# Patient Record
Sex: MALE | Race: WHITE | NOT HISPANIC OR LATINO | Employment: FULL TIME | URBAN - NONMETROPOLITAN AREA
[De-identification: names, ages, dates, MRNs, and addresses within clinical notes are randomized per-mention and may not be internally consistent; named-entity substitution may affect disease eponyms.]

---

## 2017-08-22 ENCOUNTER — HOSPITAL ENCOUNTER (EMERGENCY)
Facility: HOSPITAL | Age: 67
Discharge: HOME | End: 2017-08-22
Attending: EMERGENCY MEDICINE
Payer: MEDICARE

## 2017-08-22 VITALS
BODY MASS INDEX: 45.47 KG/M2 | HEART RATE: 64 BPM | HEIGHT: 68 IN | TEMPERATURE: 97 F | OXYGEN SATURATION: 96 % | WEIGHT: 300 LBS | DIASTOLIC BLOOD PRESSURE: 72 MMHG | RESPIRATION RATE: 18 BRPM | SYSTOLIC BLOOD PRESSURE: 139 MMHG

## 2017-08-22 DIAGNOSIS — S61.214A LACERATION OF RIGHT RING FINGER: Primary | ICD-10-CM

## 2017-08-22 DIAGNOSIS — D66 FACTOR VIII DEFICIENCY HEMOPHILIA (HCC): ICD-10-CM

## 2017-08-22 LAB
ANION GAP SERPL CALCULATED.3IONS-SCNC: 8 MMOL/L (ref 4–13)
APTT PPP: 39 SECONDS (ref 23–35)
BASOPHILS # BLD AUTO: 0.04 THOUSANDS/ΜL (ref 0–0.1)
BASOPHILS NFR BLD AUTO: 0 % (ref 0–1)
BUN SERPL-MCNC: 18 MG/DL (ref 5–25)
CALCIUM SERPL-MCNC: 9.1 MG/DL (ref 8.3–10.1)
CHLORIDE SERPL-SCNC: 102 MMOL/L (ref 100–108)
CO2 SERPL-SCNC: 29 MMOL/L (ref 21–32)
CREAT SERPL-MCNC: 0.92 MG/DL (ref 0.6–1.3)
EOSINOPHIL # BLD AUTO: 0.18 THOUSAND/ΜL (ref 0–0.61)
EOSINOPHIL NFR BLD AUTO: 2 % (ref 0–6)
ERYTHROCYTE [DISTWIDTH] IN BLOOD BY AUTOMATED COUNT: 15.6 % (ref 11.6–15.1)
GFR SERPL CREATININE-BSD FRML MDRD: 85 ML/MIN/1.73SQ M
GLUCOSE SERPL-MCNC: 98 MG/DL (ref 65–140)
HCT VFR BLD AUTO: 44.5 % (ref 36.5–49.3)
HGB BLD-MCNC: 14.4 G/DL (ref 12–17)
INR PPP: 1.03 (ref 0.86–1.16)
LYMPHOCYTES # BLD AUTO: 2.54 THOUSANDS/ΜL (ref 0.6–4.47)
LYMPHOCYTES NFR BLD AUTO: 23 % (ref 14–44)
MCH RBC QN AUTO: 27.6 PG (ref 26.8–34.3)
MCHC RBC AUTO-ENTMCNC: 32.4 G/DL (ref 31.4–37.4)
MCV RBC AUTO: 85 FL (ref 82–98)
MONOCYTES # BLD AUTO: 0.95 THOUSAND/ΜL (ref 0.17–1.22)
MONOCYTES NFR BLD AUTO: 8 % (ref 4–12)
NEUTROPHILS # BLD AUTO: 7.55 THOUSANDS/ΜL (ref 1.85–7.62)
NEUTS SEG NFR BLD AUTO: 67 % (ref 43–75)
PLATELET # BLD AUTO: 229 THOUSANDS/UL (ref 149–390)
PMV BLD AUTO: 8 FL (ref 8.9–12.7)
POTASSIUM SERPL-SCNC: 4.4 MMOL/L (ref 3.5–5.3)
PROTHROMBIN TIME: 13.4 SECONDS (ref 12.1–14.4)
RBC # BLD AUTO: 5.21 MILLION/UL (ref 3.88–5.62)
SODIUM SERPL-SCNC: 139 MMOL/L (ref 136–145)
WBC # BLD AUTO: 11.26 THOUSAND/UL (ref 4.31–10.16)

## 2017-08-22 PROCEDURE — 85730 THROMBOPLASTIN TIME PARTIAL: CPT | Performed by: EMERGENCY MEDICINE

## 2017-08-22 PROCEDURE — 36415 COLL VENOUS BLD VENIPUNCTURE: CPT | Performed by: EMERGENCY MEDICINE

## 2017-08-22 PROCEDURE — 85610 PROTHROMBIN TIME: CPT | Performed by: EMERGENCY MEDICINE

## 2017-08-22 PROCEDURE — 80048 BASIC METABOLIC PNL TOTAL CA: CPT | Performed by: EMERGENCY MEDICINE

## 2017-08-22 PROCEDURE — 96365 THER/PROPH/DIAG IV INF INIT: CPT

## 2017-08-22 PROCEDURE — 85025 COMPLETE CBC W/AUTO DIFF WBC: CPT | Performed by: EMERGENCY MEDICINE

## 2017-08-22 PROCEDURE — 99283 EMERGENCY DEPT VISIT LOW MDM: CPT

## 2017-08-22 RX ORDER — SIMVASTATIN 5 MG
5 TABLET ORAL
COMMUNITY

## 2017-08-22 RX ORDER — CEPHALEXIN 500 MG/1
500 CAPSULE ORAL 2 TIMES DAILY
Qty: 14 CAPSULE | Refills: 0 | Status: SHIPPED | OUTPATIENT
Start: 2017-08-22 | End: 2017-08-29

## 2017-08-22 RX ORDER — VARDENAFIL HYDROCHLORIDE 20 MG/1
20 TABLET ORAL DAILY PRN
COMMUNITY

## 2017-08-22 RX ORDER — HYDROCHLOROTHIAZIDE 12.5 MG/1
12.5 CAPSULE, GELATIN COATED ORAL DAILY
COMMUNITY

## 2017-08-22 RX ORDER — LOSARTAN POTASSIUM 100 MG/1
100 TABLET ORAL DAILY
COMMUNITY

## 2017-08-22 RX ORDER — LEVOTHYROXINE SODIUM 0.1 MG/1
100 TABLET ORAL DAILY
COMMUNITY

## 2017-08-22 RX ORDER — CLONIDINE HYDROCHLORIDE 0.1 MG/1
0.1 TABLET ORAL DAILY
COMMUNITY

## 2017-08-22 RX ORDER — TRIAMCINOLONE ACETONIDE 1 MG/G
CREAM TOPICAL 2 TIMES DAILY
COMMUNITY

## 2017-08-22 RX ORDER — CYCLOBENZAPRINE HCL 10 MG
10 TABLET ORAL 3 TIMES DAILY PRN
COMMUNITY

## 2017-08-22 RX ORDER — ESOMEPRAZOLE MAGNESIUM 40 MG/1
40 CAPSULE, DELAYED RELEASE ORAL
COMMUNITY

## 2017-08-22 RX ORDER — ARMODAFINIL 250 MG/1
250 TABLET ORAL DAILY
COMMUNITY

## 2017-08-22 RX ADMIN — CEFAZOLIN SODIUM 1000 MG: 1 SOLUTION INTRAVENOUS at 22:17

## 2019-08-29 ENCOUNTER — HOSPITAL ENCOUNTER (EMERGENCY)
Facility: HOSPITAL | Age: 69
Discharge: HOME | End: 2019-08-29
Attending: EMERGENCY MEDICINE | Admitting: EMERGENCY MEDICINE
Payer: MEDICARE

## 2019-08-29 ENCOUNTER — APPOINTMENT (EMERGENCY)
Dept: RADIOLOGY | Facility: HOSPITAL | Age: 69
End: 2019-08-29
Payer: MEDICARE

## 2019-08-29 ENCOUNTER — APPOINTMENT (EMERGENCY)
Dept: NON INVASIVE DIAGNOSTICS | Facility: HOSPITAL | Age: 69
End: 2019-08-29
Payer: MEDICARE

## 2019-08-29 VITALS
RESPIRATION RATE: 18 BRPM | DIASTOLIC BLOOD PRESSURE: 84 MMHG | HEIGHT: 68 IN | OXYGEN SATURATION: 95 % | HEART RATE: 63 BPM | BODY MASS INDEX: 44.24 KG/M2 | TEMPERATURE: 97.2 F | WEIGHT: 291.89 LBS | SYSTOLIC BLOOD PRESSURE: 142 MMHG

## 2019-08-29 DIAGNOSIS — S83.91XA RIGHT KNEE SPRAIN: Primary | ICD-10-CM

## 2019-08-29 PROCEDURE — 73562 X-RAY EXAM OF KNEE 3: CPT

## 2019-08-29 PROCEDURE — 99284 EMERGENCY DEPT VISIT MOD MDM: CPT

## 2019-08-29 PROCEDURE — 93971 EXTREMITY STUDY: CPT

## 2019-08-29 PROCEDURE — 93971 EXTREMITY STUDY: CPT | Performed by: SURGERY

## 2019-08-29 PROCEDURE — 99282 EMERGENCY DEPT VISIT SF MDM: CPT | Performed by: PHYSICIAN ASSISTANT

## 2019-08-29 RX ORDER — VITAMIN B COMPLEX
1 CAPSULE ORAL DAILY
COMMUNITY

## 2019-08-29 RX ORDER — FUROSEMIDE 40 MG/1
80 TABLET ORAL 2 TIMES DAILY
COMMUNITY

## 2019-08-29 RX ORDER — RIBOFLAVIN (VITAMIN B2) 100 MG
100 TABLET ORAL DAILY
COMMUNITY

## 2019-08-29 RX ORDER — MAGNESIUM 30 MG
TABLET ORAL DAILY
COMMUNITY

## 2019-08-29 RX ORDER — MULTIVITAMIN
1 CAPSULE ORAL DAILY
COMMUNITY

## 2019-08-29 RX ORDER — OMEGA-3S/DHA/EPA/FISH OIL/D3 300MG-1000
400 CAPSULE ORAL DAILY
COMMUNITY

## 2019-08-29 NOTE — ED PROVIDER NOTES
History  Chief Complaint   Patient presents with    Knee Pain     patient reports that he has had right knee pain for a week  he states that he can't remember if he injured it or not  bruising noted to the right medial aspect of knee     Patient presents to the emergency department today ambulatory via private vehicle alone offering a chief complaint of right-sided knee pain with some what he believes to be swelling and bruising of the medial aspect of the right knee  He states he has had the pain in the right knee medial aspect for about a week  He denies any acute traumatic events  No twisting or falls  States he just noted the bruising over the last 12-24 hours  Denies any chest pain shortness of breath  States he does not take any anticoagulants  Prior to Admission Medications   Prescriptions Last Dose Informant Patient Reported? Taking?    Antihemophilic Factor, Recomb, (RECOMBINATE IV)   Yes No   Sig: Infuse into a venous catheter   Armodafinil (NUVIGIL) 250 MG tablet   Yes Yes   Sig: Take 250 mg by mouth daily   Ascorbic Acid (VITAMIN C) 100 MG tablet   Yes Yes   Sig: Take 100 mg by mouth daily   Multiple Vitamin (MULTIVITAMIN) capsule   Yes Yes   Sig: Take 1 capsule by mouth daily   Potassium 99 MG TABS   Yes Yes   Sig: Take 99 mg by mouth 3 (three) times a day   Psyllium (METAMUCIL PO)   Yes Yes   Sig: Take by mouth daily   b complex vitamins capsule   Yes Yes   Sig: Take 1 capsule by mouth daily   cholecalciferol (VITAMIN D3) 400 units tablet   Yes Yes   Sig: Take 400 Units by mouth daily   cloNIDine (CATAPRES) 0 1 mg tablet   Yes Yes   Sig: Take 0 1 mg by mouth daily    cyclobenzaprine (FLEXERIL) 10 mg tablet   Yes No   Sig: Take 10 mg by mouth 3 (three) times a day as needed for muscle spasms   esomeprazole (NexIUM) 40 MG capsule   Yes Yes   Sig: Take 40 mg by mouth 2 (two) times a day before meals    furosemide (LASIX) 40 mg tablet   Yes Yes   Sig: Take 80 mg by mouth 2 (two) times a day hydrochlorothiazide (MICROZIDE) 12 5 mg capsule   Yes No   Sig: Take 12 5 mg by mouth daily   levothyroxine 100 mcg tablet   Yes Yes   Sig: Take 100 mcg by mouth daily   losartan (COZAAR) 100 MG tablet   Yes Yes   Sig: Take 100 mg by mouth daily   magnesium 30 MG tablet   Yes Yes   Sig: Take by mouth daily   metoprolol tartrate (LOPRESSOR) 25 mg tablet   Yes Yes   Sig: Take 25 mg by mouth 3 (three) times a day    simvastatin (ZOCOR) 5 MG tablet   Yes Yes   Sig: Take 5 mg by mouth daily at bedtime   triamcinolone (KENALOG) 0 1 % cream   Yes No   Sig: Apply topically 2 (two) times a day   vardenafil (LEVITRA) 20 MG tablet   Yes Yes   Sig: Take 20 mg by mouth daily as needed for erectile dysfunction      Facility-Administered Medications: None       Past Medical History:   Diagnosis Date    Arthritis     Cancer (HCC)     prostate, basil cell carcinoma    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[address] )     Hypertension        Past Surgical History:   Procedure Laterality Date    PROSTATECTOMY      UMBILICAL HERNIA REPAIR         History reviewed  No pertinent family history  I have reviewed and agree with the history as documented  Social History     Tobacco Use    Smoking status: Former Smoker    Smokeless tobacco: Never Used   Substance Use Topics    Alcohol use: Yes     Comment: social     Drug use: No        Review of Systems   Constitutional: Negative  Negative for activity change, appetite change, chills, diaphoresis, fatigue, fever and unexpected weight change  HENT: Negative  Negative for sore throat, trouble swallowing and voice change  Eyes: Negative  Respiratory: Negative  Negative for cough, chest tightness, shortness of breath and wheezing  Cardiovascular: Negative  Negative for chest pain, palpitations and leg swelling  Gastrointestinal: Negative  Negative for abdominal pain, blood in stool, nausea and vomiting  Endocrine: Negative  Genitourinary: Negative    Negative for flank pain and hematuria  Musculoskeletal: Negative  Negative for arthralgias, back pain, gait problem, joint swelling, myalgias, neck pain and neck stiffness  Right knee pain   Skin: Negative  Negative for rash and wound  Allergic/Immunologic: Negative  Neurological: Negative  Negative for dizziness, seizures, syncope, weakness, light-headedness and headaches  Hematological: Negative  Psychiatric/Behavioral: Negative  All other systems reviewed and are negative  Physical Exam  Physical Exam   Constitutional: He is oriented to person, place, and time  He appears well-developed and well-nourished  No distress  HENT:   Head: Normocephalic  Eyes: Pupils are equal, round, and reactive to light  EOM are normal    Neck: Normal range of motion  Cardiovascular: Normal rate  Pulmonary/Chest: Effort normal    Musculoskeletal: Normal range of motion  He exhibits edema and tenderness  Patient exhibits edema of the medial left knee with an area what appears to be contusion  Normal neurovascular motor exams distally  Neurological: He is alert and oriented to person, place, and time  Skin: Skin is warm  He is not diaphoretic  Vitals reviewed  Vital Signs  ED Triage Vitals [08/29/19 1205]   Temperature Pulse Respirations Blood Pressure SpO2   (!) 97 2 °F (36 2 °C) 63 16 155/76 97 %      Temp Source Heart Rate Source Patient Position - Orthostatic VS BP Location FiO2 (%)   Temporal Monitor Lying Right arm --      Pain Score       2           Vitals:    08/29/19 1205 08/29/19 1230 08/29/19 1300   BP: 155/76 142/77 142/84   Pulse: 63 62 63   Patient Position - Orthostatic VS: Lying Sitting Sitting         Visual Acuity      ED Medications  Medications - No data to display    Diagnostic Studies  Results Reviewed     None                 XR knee 3 views right non injury   ED Interpretation by Nelida Hodges PA-C (08/29 1250)   No evidence of acute osseous abnormalities    No significant effusion      Final Result by Iftikhar Gerardo MD (08/29 1313)      No acute osseous abnormality  Workstation performed: [id number]         VAS lower limb venous duplex study, unilateral/limited    (Results Pending)              Procedures  Procedures       ED Course  ED Course as of Aug 29 1321   Thu Aug 29, 2019   1211 Blood Pressure: 155/76   1211 Temperature(!): 97 2 °F (36 2 °C)   1211 Pulse: 63   1211 Respirations: 16   1211 SpO2: 97 %   1250 Awaiting vascular ultrasound      1252 No evidence of DVT per ultrasound technologist      7538 FINDINGS:    There is no acute fracture or dislocation  There is no joint effusion  Mild osteoarthritis with small osteophytes seen  No lytic or blastic lesions are seen  Soft tissues are unremarkable  Impression      No acute osseous abnormality  1320 Patient will be given follow-up information for orthopedics that he can utilize if the patient does not note improvement in symptoms  MDM    Disposition  Final diagnoses:   Right knee sprain     Time reflects when diagnosis was documented in both MDM as applicable and the Disposition within this note     Time User Action Codes Description Comment    8/29/2019 12:52 PM Rain Martini Add [E51 10MW] Right knee sprain       ED Disposition     ED Disposition Condition Date/Time Comment    Discharge Stable Thu Aug 29, 2019 12:52 PM Claudia Mcrae discharge to home/self care              Follow-up Information     Follow up With Specialties Details Why Contact Info Additional [address] [facility] [place] [facility] Schedule an appointment as soon as possible for a visit  If symptoms worsen [address] [facility] [phone]  [address] [place] [address] [facility] [place], [address] [place] [address], [place], [place], [phone]          Patient's Medications   Discharge Prescriptions    No medications on file No discharge procedures on file      ED Provider  Electronically Signed by           Nelida Hodges PA-C  08/29/19 9206

## 2022-08-18 ENCOUNTER — OFFICE VISIT (OUTPATIENT)
Dept: URGENT CARE | Facility: CLINIC | Age: 72
End: 2022-08-18
Payer: MEDICARE

## 2022-08-18 VITALS
OXYGEN SATURATION: 97 % | SYSTOLIC BLOOD PRESSURE: 164 MMHG | TEMPERATURE: 98.5 F | RESPIRATION RATE: 16 BRPM | HEART RATE: 78 BPM | DIASTOLIC BLOOD PRESSURE: 80 MMHG

## 2022-08-18 DIAGNOSIS — B34.9 VIRAL INFECTION: Primary | ICD-10-CM

## 2022-08-18 PROCEDURE — G0463 HOSPITAL OUTPT CLINIC VISIT: HCPCS

## 2022-08-18 PROCEDURE — 99203 OFFICE O/P NEW LOW 30 MIN: CPT

## 2022-08-18 NOTE — PATIENT INSTRUCTIONS
Upper Respiratory infection  - Take Vitamin D3 200IU daily, Vitamin C, and zinc  Rest and drink electrolyte rich fluids  Tylenol and ibuprofen as needed for fevers and aches following package dosing instructions  Chicken noodle soup    - No change in management if you have COVID, wear mask for the next 5 days    - Sore throat: Throat lozenges, anesthetic throat spray, warm tea with honey, and/or salt water gurgles  - Congestion relief with eating spicy food or cooking jalapeno peppers in a pan and smelling the capsicin that cooks off  Mucinex 600mg 1 tablet every 12 hours  You can use afrin or flonase for nasal congestion as directed on their packaging  Warm or cool air mist humidifiers    - Nighttime cough relief with Mucinex DM or NyQuil   - Go to the ED if you have trouble breathing or shortness of breath at rest, chest pain or pressure that lasts longer than 5 minutes, become confused or hard to wake, your lips or face are blue, you have a fever of 104°F (40°C) or higher   - Follow up with Family doctor as needed, however your symptoms may persists for 2-3 weeks from onset

## 2022-08-18 NOTE — PROGRESS NOTES
[facility]        NAME: Johnathon Lawrence is a 66 y o  male  : 1950    MRN: [id number]  DATE: 2022  TIME: 3:50 PM    Assessment and Plan   Viral infection [B34 9]  1  Viral infection           Patient Instructions     Upper Respiratory infection  - Take Vitamin D3 200IU daily, Vitamin C, and zinc  Rest and drink electrolyte rich fluids  Tylenol and ibuprofen as needed for fevers and aches following package dosing instructions  Chicken noodle soup    - No change in management if you have COVID, wear mask for the next 5 days    - Sore throat: Throat lozenges, anesthetic throat spray, warm tea with honey, and/or salt water gurgles  - Congestion relief with eating spicy food or cooking jalapeno peppers in a pan and smelling the capsicin that cooks off  Mucinex 600mg 1 tablet every 12 hours  You can use afrin or flonase for nasal congestion as directed on their packaging  Warm or cool air mist humidifiers    - Nighttime cough relief with Mucinex DM or NyQuil   - Go to the ED if you have trouble breathing or shortness of breath at rest, chest pain or pressure that lasts longer than 5 minutes, become confused or hard to wake, your lips or face are blue, you have a fever of 104°F (40°C) or higher   - Follow up with Family doctor as needed, however your symptoms may persists for 2-3 weeks from onset  Follow up with PCP in 3-5 days  Proceed to  ER if symptoms worsen  Chief Complaint     Chief Complaint   Patient presents with   Dannis Rubio Like Symptoms     COVID exposure, Saturday patient had sore throat, that has resolved         History of Present Illness     Johnathon Lawrence is a 66 y o  male who presents with complaint of COVID positive exposure 5 days ago and resolved odynophagia, congestion, and body aches that started 5 days ago and resolved 3 days ago  He reports sitting next to sister in car for 30 minutes and nephew  Both are COVID positive as of 3 days ago   He denies fevers, chills, dysphagia, shortness of breath, difficulty breathing, nausea, vomiting, diarrhea  He is fully vaccinated against COVID 19  Review of Systems   Review of Systems   Constitutional: Negative for chills, fatigue and fever  HENT: Positive for congestion and sore throat  Negative for drooling, ear pain, rhinorrhea, sinus pressure, trouble swallowing and voice change  Respiratory: Negative for cough, shortness of breath and wheezing  Cardiovascular: Negative for chest pain and palpitations  Gastrointestinal: Negative for abdominal pain, constipation, diarrhea, nausea and vomiting  Musculoskeletal: Negative for myalgias  Neurological: Negative for dizziness, light-headedness and headaches           Current Medications       Current Outpatient Medications:     Antihemophilic Factor, Recomb, (RECOMBINATE IV), Infuse into a venous catheter, Disp: , Rfl:     Armodafinil 250 MG tablet, Take 250 mg by mouth daily, Disp: , Rfl:     Ascorbic Acid (VITAMIN C) 100 MG tablet, Take 100 mg by mouth daily, Disp: , Rfl:     b complex vitamins capsule, Take 1 capsule by mouth daily, Disp: , Rfl:     cholecalciferol (VITAMIN D3) 400 units tablet, Take 400 Units by mouth daily, Disp: , Rfl:     cyclobenzaprine (FLEXERIL) 10 mg tablet, Take 10 mg by mouth 3 (three) times a day as needed for muscle spasms, Disp: , Rfl:     esomeprazole (NexIUM) 40 MG capsule, Take 40 mg by mouth 2 (two) times a day before meals , Disp: , Rfl:     furosemide (LASIX) 40 mg tablet, Take 80 mg by mouth 2 (two) times a day, Disp: , Rfl:     losartan (COZAAR) 100 MG tablet, Take 100 mg by mouth daily, Disp: , Rfl:     metoprolol tartrate (LOPRESSOR) 25 mg tablet, Take 25 mg by mouth 3 (three) times a day , Disp: , Rfl:     Multiple Vitamin (MULTIVITAMIN) capsule, Take 1 capsule by mouth daily, Disp: , Rfl:     Potassium 99 MG TABS, Take 99 mg by mouth 3 (three) times a day, Disp: , Rfl:     Psyllium (METAMUCIL PO), Take by mouth daily, Disp: , Rfl:     simvastatin (ZOCOR) 5 MG tablet, Take 5 mg by mouth daily at bedtime, Disp: , Rfl:     triamcinolone (KENALOG) 0 1 % cream, Apply topically 2 (two) times a day, Disp: , Rfl:     vardenafil (LEVITRA) 20 MG tablet, Take 20 mg by mouth daily as needed for erectile dysfunction, Disp: , Rfl:     cloNIDine (CATAPRES) 0 1 mg tablet, Take 0 1 mg by mouth daily  (Patient not taking: Reported on 8/18/2022), Disp: , Rfl:     hydrochlorothiazide (MICROZIDE) 12 5 mg capsule, Take 12 5 mg by mouth daily (Patient not taking: Reported on 8/18/2022), Disp: , Rfl:     levothyroxine 100 mcg tablet, Take 100 mcg by mouth daily (Patient not taking: Reported on 8/18/2022), Disp: , Rfl:     magnesium 30 MG tablet, Take by mouth daily (Patient not taking: Reported on 8/18/2022), Disp: , Rfl:     Current Allergies     Allergies as of 08/18/2022 - Reviewed 08/18/2022   Allergen Reaction Noted    Advil [ibuprofen]  08/22/2017    Amlodipine  08/22/2017    Asa [aspirin]  08/22/2017    Daypro [oxaprozin]  08/22/2017    Gabapentin  08/22/2017    Lansoprazole  08/22/2017    Levothyroxine  08/22/2017    Omeprazole  08/22/2017    Protonix [pantoprazole]  08/22/2017    Tiazac [diltiazem]  08/22/2017    Vasorex [isoxsuprine]  08/22/2017    Vioxx [rofecoxib]  08/22/2017    Zantac [ranitidine]  08/22/2017            The following portions of the patient's history were reviewed and updated as appropriate: allergies, current medications, past family history, past medical history, past social history, past surgical history and problem list      Past Medical History:   Diagnosis Date    Arthritis     Cancer ([facility] [address] )     prostate, basil cell carcinoma    [facility]     Hypertension        Past Surgical History:   Procedure Laterality Date    PROSTATECTOMY      UMBILICAL HERNIA REPAIR         History reviewed  No pertinent family history  Medications have been verified          Objective /80   Pulse 78   Temp 98 5 °F (36 9 °C)   Resp 16   SpO2 97%   No LMP for male patient  Physical Exam     Physical Exam  Vitals reviewed  Constitutional:       General: He is not in acute distress  Appearance: Normal appearance  He is obese  He is not ill-appearing  HENT:      Head: Normocephalic and atraumatic  Right Ear: Hearing, tympanic membrane, ear canal and external ear normal  No tenderness  There is no impacted cerumen  Tympanic membrane is not injected, perforated or erythematous  Left Ear: Hearing, tympanic membrane, ear canal and external ear normal  No tenderness  There is no impacted cerumen  Tympanic membrane is not injected, perforated or erythematous  Nose: Congestion present  No rhinorrhea  Mouth/Throat:      Lips: Pink  Mouth: Mucous membranes are moist       Pharynx: Oropharynx is clear  Uvula midline  Posterior oropharyngeal erythema and uvula swelling present  No oropharyngeal exudate  Tonsils: No tonsillar exudate or tonsillar abscesses  0 on the right  0 on the left  Eyes:      General:         Right eye: No discharge  Left eye: No discharge  Extraocular Movements: Extraocular movements intact  Conjunctiva/sclera: Conjunctivae normal       Pupils: Pupils are equal, round, and reactive to light  Cardiovascular:      Rate and Rhythm: Normal rate and regular rhythm  Heart sounds: Normal heart sounds  No murmur heard  No friction rub  No gallop  Pulmonary:      Effort: Pulmonary effort is normal       Breath sounds: Normal breath sounds  No wheezing, rhonchi or rales  Neurological:      Mental Status: He is alert

## 2025-01-03 ENCOUNTER — OFFICE VISIT (OUTPATIENT)
Dept: URGENT CARE | Facility: CLINIC | Age: 75
End: 2025-01-03
Payer: MEDICARE

## 2025-01-03 VITALS
HEIGHT: 68 IN | RESPIRATION RATE: 14 BRPM | TEMPERATURE: 96.6 F | SYSTOLIC BLOOD PRESSURE: 132 MMHG | DIASTOLIC BLOOD PRESSURE: 78 MMHG | WEIGHT: 252.6 LBS | HEART RATE: 77 BPM | BODY MASS INDEX: 38.28 KG/M2

## 2025-01-03 DIAGNOSIS — J06.9 VIRAL URI: Primary | ICD-10-CM

## 2025-01-03 PROCEDURE — 99213 OFFICE O/P EST LOW 20 MIN: CPT | Performed by: PREVENTIVE MEDICINE

## 2025-01-03 PROCEDURE — G0463 HOSPITAL OUTPT CLINIC VISIT: HCPCS | Performed by: PREVENTIVE MEDICINE

## 2025-01-03 RX ORDER — CICLOPIROX OLAMINE 7.7 MG/G
CREAM TOPICAL
COMMUNITY
Start: 2024-03-13

## 2025-01-03 RX ORDER — LOSARTAN POTASSIUM AND HYDROCHLOROTHIAZIDE 12.5; 1 MG/1; MG/1
1 TABLET ORAL DAILY
COMMUNITY

## 2025-01-03 RX ORDER — PREDNISONE 20 MG/1
40 TABLET ORAL DAILY
Qty: 10 TABLET | Refills: 0 | Status: SHIPPED | OUTPATIENT
Start: 2025-01-03 | End: 2025-01-08

## 2025-01-03 RX ORDER — TRANEXAMIC ACID 650 MG/1
1300 TABLET ORAL DAILY PRN
COMMUNITY

## 2025-01-03 RX ORDER — LEVOTHYROXINE SODIUM 125 MCG
1 TABLET ORAL DAILY
COMMUNITY
Start: 2024-11-21

## 2025-01-03 RX ORDER — AMOXICILLIN 875 MG/1
875 TABLET, COATED ORAL 2 TIMES DAILY
Qty: 10 TABLET | Refills: 0 | Status: SHIPPED | OUTPATIENT
Start: 2025-01-03 | End: 2025-01-08

## 2025-01-03 NOTE — PATIENT INSTRUCTIONS
Only begin taking the antibiotics if your symptoms do not improve in the next 2 days or if your symptoms worsen. Otherwise, do not take this medicine    Take the sterodis each day with food and take them at the same time each day    Most upper respiratory infections are viral and resolve on their own within 10-14 days. Antibiotics are not indicated for the viral infection, and are only prescribed if there is evidence for a bacterial infection. Sometimes an upper respiratory infection may lead to secondary bacterial infection, such as bacterial sinusitis, in which case antibiotics would be indicated at that time. If your symptoms continue beyond 10-14 days or if you experience ongoing fevers, productive cough with green, brown, bloody phlegm production, you may have developed a bacterial infection. For the uncomplicated viral upper respiratory infection conservative management includes:     Fever and pain control:  Ibuprofen (Motrin) 600mg every 6 hours for fever, headaches, body aches   Ibuprofen is an NSAID. Please stop medication if you experience stomach/abdominal pain and report to your primary care provider.   Ask your primary care provider before you take NSAIDs if you are on any blood thinners, or if you have a history of heart disease, kidney disease, gastric bypass surgery, GI bleed, or poorly controlled high blood pressure.   May use acetaminophen (Tylenol) as directed on the bottle between doses of ibuprofen. Do not exceed 4,000mg of Tylenol a day.   Cough & Congestion:  Guaifenesin (Mucinex) as directed on the bottle for congestion and mucous-y cough.   Dextromethorphan (Delsym, Robitussin) for dry cough and cough suppression   Pseudoephedrine (Sudafed) for congestion and sinus pressure   Sudafed may cause increased heart rate, irregular heart rate, and an increase in blood pressure. Please do not take Sudafed if you have a history of heart disease or high blood pressure.   Sudafed should not be taken if  you are on anti-depressants such as those belonging to the class MAOIs or tricyclics.  Coricidin HBP (chlorpheniramine maleate) can be used as a decongestant in place of other options for those unable to take Sudafed.   Combination cough and cold such as Dimetapp and Mucinex DM also available  Sudafed PE Head Congestion +Flu Severe contains a combination of Sudafed, Tylenol, Mucinex, and Delsym  If prescribed, take Tessalon Pearles or Bromfed/Phenergan DM as directed  Avoid taking prescription cough/congestion medication and OTC options at the same time  Sore Throat:  Cepacol lozenges  Chloraseptic spray  Throat Coat tea  Warm salt water gargles   Vitamin/Minerals:  Vitamin D3 2,000 IU daily  Vitamin C 1000mg twice a day  Some studies suggest that Zinc 12.5-15mg every 2 hours while awake for 5 days may shorten symptom duration by 1-2 days  Other:   Plenty of fluids and rest  Cool mist humidifiers  Nasal sinus rinses such as NettiPot, Neimed, or Navage can be used to help flush out sinuses  Please only use distilled/sterile water that can be purchased at your local pharmacy  Nasal spray options:  Nasal steroid sprays such as Flonase, Nasonex, Nasacort may help with sinus congestion, itchy/watery eyes, clogged ears  These options must be used consistently for at least 2 weeks for full effect  Afrin nasal spray for quick acting congestion relief  Saline nasal spray for dry nose, irritation of the nasal passages  Follow up with PCP in 3-5 days  Proceed to the ED if symptoms worsen

## 2025-01-03 NOTE — PROGRESS NOTES
[facility]        NAME: Jeferson Peterson is a 74 y.o. male  : 1950    MRN: [id number]  DATE: January 3, 2025  TIME: 10:29 AM    Assessment and Plan   Viral URI [J06.9]  1. Viral URI  predniSONE 20 mg tablet    amoxicillin (AMOXIL) 875 mg tablet        Advised patient to utilize over-the-counter medications to help control symptoms.  Will treat patient as a viral URI at this time.  Prednisone sent to pharmacy.  Amoxicillin -advised patient to do a 48 to 72-hour watch and wait prior to starting this medication.  If symptoms worsen in that time.  Or if symptoms do not improve in the next 72 hours he was advised he may start this medication at that time.  Advised to follow-up with family doctor if symptoms are not improved in the next 5 to 7 days.  However, if he develops any worsening symptoms, chest pain/pressure/tightness, shortness of breath or palpitations he was advised to seek care with the emergency room for further evaluation and treatment.    Patient Instructions       Follow up with PCP in 3-5 days.  Proceed to  ER if symptoms worsen.    If tests are performed, our office will contact you with results only if changes need to made to the care plan discussed with you at the visit. You can review your full results on [facility].    Chief Complaint     Chief Complaint   Patient presents with    Cough     Started 3 days ago. OTC tylenol taken this am .     Sore Throat    Generalized Body Aches         History of Present Illness       74-year-old male with significant past medical history presents to this clinic with complaint of cough, sore throat, generalized bodyaches.  Symptoms started 3 days ago (24).  He took 1 dose of over-the-counter Tylenol this a.m.    Cough  Associated symptoms include myalgias and a sore throat.   Sore Throat   Associated symptoms include coughing.   Generalized Body Aches  Associated symptoms include a sore throat and coughing.       Review of Systems    Review of Systems   HENT:  Positive for sore throat.    Respiratory:  Positive for cough.    Musculoskeletal:  Positive for myalgias.         Current Medications       Current Outpatient Medications:     amoxicillin (AMOXIL) 875 mg tablet, Take 1 tablet (875 mg total) by mouth 2 (two) times a day for 5 days, Disp: 10 tablet, Rfl: 0    Antihemophilic Factor, Recomb, (RECOMBINATE IV), Infuse into a venous catheter, Disp: , Rfl:     Armodafinil 250 MG tablet, Take 250 mg by mouth daily, Disp: , Rfl:     Ascorbic Acid (VITAMIN C) 100 MG tablet, Take 100 mg by mouth daily, Disp: , Rfl:     b complex vitamins capsule, Take 1 capsule by mouth daily, Disp: , Rfl:     cholecalciferol (VITAMIN D3) 400 units tablet, Take 400 Units by mouth daily, Disp: , Rfl:     ciclopirox (LOPROX) 0.77 % cream, , Disp: , Rfl:     COENZYME Q-10 PO, Take 100 mg by mouth daily, Disp: , Rfl:     esomeprazole (NexIUM) 40 MG capsule, Take 40 mg by mouth 2 (two) times a day before meals , Disp: , Rfl:     Ferrous Sulfate Dried (FERROUS SULFATE IRON PO), Take 325 mg by mouth, Disp: , Rfl:     furosemide (LASIX) 40 mg tablet, Take 80 mg by mouth 2 (two) times a day, Disp: , Rfl:     losartan (COZAAR) 100 MG tablet, Take 100 mg by mouth daily, Disp: , Rfl:     losartan-hydrochlorothiazide (HYZAAR) 100-12.5 MG per tablet, Take 1 tablet by mouth daily, Disp: , Rfl:     metoprolol tartrate (LOPRESSOR) 25 mg tablet, Take 25 mg by mouth 3 (three) times a day , Disp: , Rfl:     Multiple Vitamin (MULTIVITAMIN) capsule, Take 1 capsule by mouth daily, Disp: , Rfl:     Potassium 99 MG TABS, Take 99 mg by mouth 3 (three) times a day, Disp: , Rfl:     predniSONE 20 mg tablet, Take 2 tablets (40 mg total) by mouth daily for 5 days, Disp: 10 tablet, Rfl: 0    simvastatin (ZOCOR) 5 MG tablet, Take 5 mg by mouth daily at bedtime, Disp: , Rfl:     Synthroid 125 MCG tablet, Take 1 tablet by mouth in the morning, Disp: , Rfl:     Tranexamic Acid 650 MG TABS, Take  1,300 mg by mouth daily as needed, Disp: , Rfl:     triamcinolone (KENALOG) 0.1 % cream, Apply topically 2 (two) times a day, Disp: , Rfl:     vardenafil (LEVITRA) 20 MG tablet, Take 20 mg by mouth daily as needed for erectile dysfunction, Disp: , Rfl:     cloNIDine (CATAPRES) 0.1 mg tablet, Take 0.1 mg by mouth daily  (Patient not taking: Reported on 1/3/2025), Disp: , Rfl:     cyclobenzaprine (FLEXERIL) 10 mg tablet, Take 10 mg by mouth 3 (three) times a day as needed for muscle spasms (Patient not taking: Reported on 1/3/2025), Disp: , Rfl:     hydrochlorothiazide (MICROZIDE) 12.5 mg capsule, Take 12.5 mg by mouth daily (Patient not taking: Reported on 1/3/2025), Disp: , Rfl:     levothyroxine 100 mcg tablet, Take 100 mcg by mouth daily (Patient not taking: Reported on 1/3/2025), Disp: , Rfl:     magnesium 30 MG tablet, Take by mouth daily (Patient not taking: Reported on 1/3/2025), Disp: , Rfl:     Psyllium (METAMUCIL PO), Take by mouth daily (Patient not taking: Reported on 1/3/2025), Disp: , Rfl:     Current Allergies     Allergies as of 01/03/2025 - Reviewed 01/03/2025   Allergen Reaction Noted    Doxazosin Hypertension 07/01/2013    Enalapril-hydrochlorothiazide Palpitations 07/01/2013    Omeprazole GI Intolerance 12/09/2011    Allopurinol Other (See Comments) 03/08/2018    Amlodipine Other (See Comments) 08/22/2017    Aspirin GI Bleeding 02/17/2011    Calcium channel blockers Other (See Comments) 01/03/2025    Diltiazem Other (See Comments) 02/17/2011    Gabapentin Other (See Comments) 08/22/2017    Ibuprofen GI Bleeding 02/17/2011    Lansoprazole Other (See Comments) 12/09/2011    Levothyroxine Myalgia 08/22/2017    Oxaprozin Other (See Comments) 12/09/2011    Protonix [pantoprazole]  08/22/2017    Ranitidine Other (See Comments) 02/17/2011    Rofecoxib Other (See Comments) 02/17/2011    Vasorex [isoxsuprine]  08/22/2017    Enalapril Palpitations 03/26/2019            The following portions of the  "patient's history were reviewed and updated as appropriate: allergies, current medications, past family history, past medical history, past social history, past surgical history and problem list.     Past Medical History:   Diagnosis Date    Arthritis     Cancer (HCC)     prostate, basil cell carcinoma    Factor VIII deficiency hemophilia (HCC)     Hypertension        Past Surgical History:   Procedure Laterality Date    COLONOSCOPY W/ BIOPSIES AND POLYPECTOMY      PROSTATECTOMY      TONSILLECTOMY AND ADENOIDECTOMY Bilateral     UMBILICAL HERNIA REPAIR         Family History   Problem Relation Age of Onset    No Known Problems Mother     No Known Problems Father          Medications have been verified.        Objective   /78   Pulse 77   Temp (!) 96.6 °F (35.9 °C)   Resp 14   Ht 5' 8\" (1.727 m)   Wt 115 kg (252 lb 9.6 oz)   PF 97 L/min   BMI 38.41 kg/m²        Physical Exam     Physical Exam  Vitals and nursing note reviewed.   Constitutional:       Appearance: Normal appearance. He is obese.   HENT:      Head: Normocephalic and atraumatic.      Right Ear: Ear canal and external ear normal. Tympanic membrane is injected.      Left Ear: Ear canal and external ear normal. Tympanic membrane is injected.      Nose: Congestion and rhinorrhea present. Rhinorrhea is clear.      Right Turbinates: Swollen.      Left Turbinates: Swollen.      Right Sinus: No maxillary sinus tenderness or frontal sinus tenderness.      Left Sinus: No maxillary sinus tenderness or frontal sinus tenderness.      Mouth/Throat:      Lips: Pink.      Mouth: Mucous membranes are moist.      Tongue: No lesions. Tongue does not deviate from midline.      Palate: No mass and lesions.      Pharynx: Uvula midline. Pharyngeal swelling, posterior oropharyngeal erythema, uvula swelling and postnasal drip present.      Comments: Tonsils surgically absent  Eyes:      Extraocular Movements: Extraocular movements intact.      Conjunctiva/sclera: " Conjunctivae normal.      Pupils: Pupils are equal, round, and reactive to light.   Cardiovascular:      Rate and Rhythm: Normal rate and regular rhythm.      Pulses: Normal pulses.      Heart sounds: Normal heart sounds.   Pulmonary:      Effort: Pulmonary effort is normal.      Breath sounds: Normal breath sounds.   Abdominal:      General: Bowel sounds are normal.      Palpations: Abdomen is soft.   Musculoskeletal:      Cervical back: Normal range of motion and neck supple.   Lymphadenopathy:      Cervical: No cervical adenopathy.   Skin:     General: Skin is warm and dry.      Capillary Refill: Capillary refill takes less than 2 seconds.   Neurological:      General: No focal deficit present.      Mental Status: He is alert and oriented to person, place, and time.

## 2025-06-09 ENCOUNTER — OFFICE VISIT (OUTPATIENT)
Dept: URGENT CARE | Facility: CLINIC | Age: 75
End: 2025-06-09
Payer: MEDICARE

## 2025-06-09 VITALS
DIASTOLIC BLOOD PRESSURE: 101 MMHG | HEIGHT: 68 IN | WEIGHT: 255 LBS | SYSTOLIC BLOOD PRESSURE: 185 MMHG | BODY MASS INDEX: 38.65 KG/M2 | HEART RATE: 85 BPM | RESPIRATION RATE: 18 BRPM | OXYGEN SATURATION: 98 % | TEMPERATURE: 98.1 F

## 2025-06-09 DIAGNOSIS — S30.852A: ICD-10-CM

## 2025-06-09 DIAGNOSIS — S30.21XA: ICD-10-CM
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
## 2025-06-09 PROCEDURE — G0463 HOSPITAL OUTPT CLINIC VISIT: HCPCS | Performed by: PHYSICIAN ASSISTANT

## 2025-06-09 PROCEDURE — 99215 OFFICE O/P EST HI 40 MIN: CPT | Performed by: PHYSICIAN ASSISTANT

## 2025-06-09 NOTE — PROGRESS NOTES
1800  Ring intact to base of penis.  Gross swelling and hematoma of the penis noted.  Ambulance called for expedited trip to th ER.

## 2025-06-09 NOTE — PROGRESS NOTES
[facility]        NAME: Jeferson Peterson is a 74 y.o. male  : 1950    MRN: [id number]  DATE: 2025  TIME: 6:27 PM    Assessment and Plan   Ring or other jewelry causing external constriction, initial encounter [W49.04XA]  1. Ring or other jewelry causing external constriction, initial encounter  Transfer to other facility      2. Foreign body in skin of penis        3. Traumatic hematoma of foreskin          Patient with PMH of hemophilia -A presents with a constricting ring lodged at the proximal shaft of the penis, associated with significant distant perineal swelling and underlying hematoma formation.  Findings are consistent with vascular congestion and possible venous outflow obstruction.  Emergency evaluation recommended.  Due to the severity of the penile constriction in association swelling, and underlying hematoma, 911 was called for emergency medical evaluation and transportation to the emergency department for urgent urological assessment and intervention.  Patient Instructions       Proceed to  ER   Chief Complaint     Chief Complaint   Patient presents with    Penis Injury     Ring stuck on penis  Started about 15 hours ago  Getting more painful and swollen         History of Present Illness       Injury  The incident occurred 12 to 24 hours ago. The incident occurred at home. Injury mechanism: ring stuck on penis. The pain is severe. Pertinent negatives include no coughing. There have been no prior injuries to these areas.       Review of Systems   Review of Systems   Constitutional:  Negative for fever.   HENT:  Negative for congestion, drooling, sinus pressure, sinus pain, sore throat and trouble swallowing.    Respiratory:  Negative for cough, chest tightness and shortness of breath.    Cardiovascular: Negative.    Genitourinary:  Positive for penile pain and penile swelling. Negative for decreased urine volume and urgency.         Current Medications     Current  "Medications[1]    Current Allergies     Allergies as of 06/09/2025 - Reviewed 06/09/2025   Allergen Reaction Noted    Doxazosin Hypertension 07/01/2013    Enalapril-hydrochlorothiazide Palpitations 07/01/2013    Omeprazole GI Intolerance 12/09/2011    Allopurinol Other (See Comments) 03/08/2018    Amlodipine Other (See Comments) 08/22/2017    Aspirin GI Bleeding 02/17/2011    Calcium channel blockers Other (See Comments) 01/03/2025    Diltiazem Other (See Comments) 02/17/2011    Gabapentin Other (See Comments) 08/22/2017    Ibuprofen GI Bleeding 02/17/2011    Lansoprazole Other (See Comments) 12/09/2011    Levothyroxine Myalgia 08/22/2017    Oxaprozin Other (See Comments) 12/09/2011    Protonix [pantoprazole]  08/22/2017    Ranitidine Other (See Comments) 02/17/2011    Rofecoxib Other (See Comments) 02/17/2011    Vasorex [isoxsuprine]  08/22/2017    Enalapril Palpitations 03/26/2019            The following portions of the patient's history were reviewed and updated as appropriate: allergies, current medications, past family history, past medical history, past social history, past surgical history and problem list.     Past Medical History[2]    Past Surgical History[3]    Family History[4]      Medications have been verified.        Objective   BP (!) 185/101   Pulse 85   Temp 98.1 °F (36.7 °C)   Resp 18   Ht 5' 8\" (1.727 m)   Wt 116 kg (255 lb)   SpO2 98%   BMI 38.77 kg/m²   No LMP for male patient.       Physical Exam     Physical Exam  Vitals and nursing note reviewed. Exam conducted with a chaperone present.   Constitutional:       General: He is not in acute distress.     Appearance: He is well-developed. He is not diaphoretic.     Cardiovascular:      Rate and Rhythm: Normal rate and regular rhythm.      Heart sounds: Normal heart sounds. No murmur heard.     No friction rub. No gallop.   Pulmonary:      Effort: Pulmonary effort is normal. No respiratory distress.      Breath sounds: Normal breath " sounds. No wheezing, rhonchi or rales.   Abdominal:      Palpations: Abdomen is soft.      Tenderness: There is no abdominal tenderness.   Genitourinary:     Penis: Erythema, tenderness and swelling present.       Comments: Constricting ring lodged at the proximal shaft of the penis, associated with significant distance swelling and underlying hematoma formation.    Skin:     General: Skin is warm.     Neurological:      Mental Status: He is alert.     Psychiatric:         Behavior: Behavior normal.         Thought Content: Thought content normal.         Judgment: Judgment normal.                          [1]   Current Outpatient Medications:     Armodafinil 250 MG tablet, Take 250 mg by mouth in the morning., Disp: , Rfl:     Ascorbic Acid (VITAMIN C) 100 MG tablet, Take 100 mg by mouth in the morning., Disp: , Rfl:     b complex vitamins capsule, Take 1 capsule by mouth in the morning., Disp: , Rfl:     cholecalciferol (VITAMIN D3) 400 units tablet, Take 400 Units by mouth in the morning., Disp: , Rfl:     ciclopirox (LOPROX) 0.77 % cream, , Disp: , Rfl:     COENZYME Q-10 PO, Take 100 mg by mouth in the morning., Disp: , Rfl:     esomeprazole (NexIUM) 40 MG capsule, Take 40 mg by mouth in the morning and 40 mg in the evening. Take before meals., Disp: , Rfl:     furosemide (LASIX) 40 mg tablet, Take 80 mg by mouth in the morning and 80 mg in the evening., Disp: , Rfl:     losartan (COZAAR) 100 MG tablet, Take 100 mg by mouth in the morning., Disp: , Rfl:     losartan-hydrochlorothiazide (HYZAAR) 100-12.5 MG per tablet, Take 1 tablet by mouth in the morning., Disp: , Rfl:     metoprolol tartrate (LOPRESSOR) 25 mg tablet, Take 25 mg by mouth in the morning and 25 mg in the evening and 25 mg before bedtime., Disp: , Rfl:     Multiple Vitamin (MULTIVITAMIN) capsule, Take 1 capsule by mouth in the morning., Disp: , Rfl:     Potassium 99 MG TABS, Take 99 mg by mouth in the morning and 99 mg in the evening and 99 mg  before bedtime., Disp: , Rfl:     simvastatin (ZOCOR) 5 MG tablet, Take 5 mg by mouth daily at bedtime, Disp: , Rfl:     Synthroid 125 MCG tablet, Take 1 tablet by mouth in the morning, Disp: , Rfl:     Tranexamic Acid 650 MG TABS, Take 1,300 mg by mouth daily as needed, Disp: , Rfl:     triamcinolone (KENALOG) 0.1 % cream, Apply topically in the morning and in the evening., Disp: , Rfl:     vardenafil (LEVITRA) 20 MG tablet, Take 20 mg by mouth daily as needed for erectile dysfunction, Disp: , Rfl:     Antihemophilic Factor, Recomb, (RECOMBINATE IV), Infuse into a venous catheter (Patient not taking: Reported on 6/9/2025), Disp: , Rfl:     cloNIDine (CATAPRES) 0.1 mg tablet, Take 0.1 mg by mouth daily  (Patient not taking: Reported on 8/18/2022), Disp: , Rfl:     cyclobenzaprine (FLEXERIL) 10 mg tablet, Take 10 mg by mouth 3 (three) times a day as needed for muscle spasms (Patient not taking: Reported on 6/9/2025), Disp: , Rfl:     Esomeprazole Magnesium (NEXIUM 24HR PO), Take by mouth (Patient not taking: Reported on 6/9/2025), Disp: , Rfl:     Ferrous Sulfate Dried (FERROUS SULFATE IRON PO), Take 325 mg by mouth (Patient not taking: Reported on 6/9/2025), Disp: , Rfl:     hydrochlorothiazide (MICROZIDE) 12.5 mg capsule, Take 12.5 mg by mouth daily (Patient not taking: Reported on 8/18/2022), Disp: , Rfl:     levothyroxine 100 mcg tablet, Take 100 mcg by mouth daily (Patient not taking: Reported on 8/18/2022), Disp: , Rfl:     magnesium 30 MG tablet, Take by mouth daily (Patient not taking: Reported on 8/18/2022), Disp: , Rfl:     Psyllium (METAMUCIL PO), Take by mouth daily (Patient not taking: Reported on 6/9/2025), Disp: , Rfl:   [2]   Past Medical History:  Diagnosis Date    Arthritis     Cancer (HCC)     prostate, basil cell carcinoma    Factor VIII deficiency hemophilia (HCC)     Hypertension    [3]   Past Surgical History:  Procedure Laterality Date    COLONOSCOPY W/ BIOPSIES AND POLYPECTOMY       PROSTATECTOMY      TONSILLECTOMY AND ADENOIDECTOMY Bilateral     UMBILICAL HERNIA REPAIR     [4]   Family History  Problem Relation Name Age of Onset    No Known Problems Mother      No Known Problems Father

## 2025-06-17 ENCOUNTER — APPOINTMENT (OUTPATIENT)
Dept: RADIOLOGY | Facility: CLINIC | Age: 75
End: 2025-06-17
Attending: PHYSICIAN ASSISTANT
Payer: MEDICARE

## 2025-06-17 ENCOUNTER — OFFICE VISIT (OUTPATIENT)
Dept: URGENT CARE | Facility: CLINIC | Age: 75
End: 2025-06-17
Payer: MEDICARE

## 2025-06-17 ENCOUNTER — RESULTS FOLLOW-UP (OUTPATIENT)
Dept: URGENT CARE | Facility: CLINIC | Age: 75
End: 2025-06-17

## 2025-06-17 VITALS
SYSTOLIC BLOOD PRESSURE: 158 MMHG | RESPIRATION RATE: 18 BRPM | DIASTOLIC BLOOD PRESSURE: 78 MMHG | WEIGHT: 255 LBS | OXYGEN SATURATION: 99 % | HEIGHT: 68 IN | BODY MASS INDEX: 38.65 KG/M2 | HEART RATE: 67 BPM | TEMPERATURE: 98.1 F

## 2025-06-17 DIAGNOSIS — M25.552 PAIN OF LEFT HIP: ICD-10-CM

## 2025-06-17 DIAGNOSIS — S72.001A CLOSED FRACTURE OF RIGHT HIP, INITIAL ENCOUNTER (HCC): Primary | ICD-10-CM

## 2025-06-17 PROCEDURE — G0463 HOSPITAL OUTPT CLINIC VISIT: HCPCS | Performed by: PHYSICIAN ASSISTANT

## 2025-06-17 PROCEDURE — 99214 OFFICE O/P EST MOD 30 MIN: CPT | Performed by: PHYSICIAN ASSISTANT

## 2025-06-17 PROCEDURE — 73502 X-RAY EXAM HIP UNI 2-3 VIEWS: CPT

## 2025-06-17 RX ORDER — TRAMADOL HYDROCHLORIDE 50 MG/1
50 TABLET ORAL EVERY 6 HOURS PRN
Qty: 20 TABLET | Refills: 0 | Status: SHIPPED | OUTPATIENT
Start: 2025-06-17

## 2025-06-17 NOTE — PATIENT INSTRUCTIONS
Follow-up with orthopedics sometime in the next week. Referral has been provided for you.   Take tramadol as prescribed    Supplement with Tylenol 1000 mg every 8 hours as needed, alternating every 4 hours with ibuprofen.  Ice 20 minutes on 20 minutes off.  Elevate above the level of the heart whenever not in use.    Follow up with PCP in 3-5 days.  Proceed to  ER if symptoms worsen.    If tests have been performed at [facility], our office will contact you with results if changes need to be made to the care plan discussed with you at the visit.  You can review your full results on [facility] MyChart.

## 2025-06-17 NOTE — PROGRESS NOTES
[facility]        NAME: Jeferson Peterson is a 74 y.o. male  : 1950    MRN: [id number]  DATE: 2025  TIME: 2:17 PM    Assessment and Plan   Closed fracture of right hip, initial encounter ([facility]) [S72.001A]  1. Closed fracture of right hip, initial encounter ([facility])  traMADol (Ultram) 50 mg tablet    Ambulatory Referral to Orthopedic Surgery    CANCELED: Ambulatory Referral to Orthopedic Surgery      2. Pain of left hip  CANCELED: XR hip/pelv 4+ vw left if performed        X-ray HERMANN initial interpretation of hip: Possible fracture.  Prescribed the pain medication for the next 5 day.  Referred him to orthopedic.    Patient Instructions         Follow-up with orthopedics sometime in the next week. Referral has been provided for you.   Take tramadol as prescribed    Supplement with Tylenol 1000 mg every 8 hours as needed, alternating every 4 hours with ibuprofen.  Ice 20 minutes on 20 minutes off.  Elevate above the level of the heart whenever not in use.    Follow up with PCP in 3-5 days.  Proceed to  ER if symptoms worsen.    If tests have been performed at [facility], our office will contact you with results if changes need to be made to the care plan discussed with you at the visit.  [facility]    Chief Complaint     Chief Complaint   Patient presents with    Hip Pain     Right Hip pain symptoms started 2 weeks ago. States he didn't fall that he knows of          History of Present Illness       Hip Pain   Incident onset: 2 weeks. The incident occurred at home. There was no injury mechanism. Pain location: Right hip. The quality of the pain is described as aching. The pain is at a severity of 9/10. The pain is moderate. The pain has been Constant since onset. Associated symptoms include an inability to bear weight. Pertinent negatives include no numbness. He reports no foreign bodies present. The symptoms are aggravated by movement. He has tried  "acetaminophen for the symptoms. The treatment provided no relief.       Review of Systems   Review of Systems   Constitutional:  Negative for chills and fever.   Musculoskeletal:  Positive for arthralgias. Negative for gait problem and joint swelling.   Skin:  Positive for color change.   Neurological:  Negative for weakness and numbness.   All other systems reviewed and are negative.        Current Medications     Current Medications[1]    Current Allergies     Allergies as of 06/17/2025 - Reviewed 06/17/2025   Allergen Reaction Noted    Doxazosin Hypertension 07/01/2013    Enalapril-hydrochlorothiazide Palpitations 07/01/2013    Omeprazole GI Intolerance 12/09/2011    Allopurinol Other (See Comments) 03/08/2018    Amlodipine Other (See Comments) 08/22/2017    Aspirin GI Bleeding 02/17/2011    Calcium channel blockers Other (See Comments) 01/03/2025    Diltiazem Other (See Comments) 02/17/2011    Gabapentin Other (See Comments) 08/22/2017    Ibuprofen GI Bleeding 02/17/2011    Lansoprazole Other (See Comments) 12/09/2011    Levothyroxine Myalgia 08/22/2017    Oxaprozin Other (See Comments) 12/09/2011    Protonix [pantoprazole]  08/22/2017    Ranitidine Other (See Comments) 02/17/2011    Rofecoxib Other (See Comments) 02/17/2011    Vasorex [isoxsuprine]  08/22/2017    Enalapril Palpitations 03/26/2019            The following portions of the patient's history were reviewed and updated as appropriate: allergies, current medications, past family history, past medical history, past social history, past surgical history and problem list.     Past Medical History[2]    Past Surgical History[3]    Family History[4]      Medications have been verified.        Objective   /78 (BP Location: Left arm, Patient Position: Sitting, Cuff Size: Adult)   Pulse 67   Temp 98.1 °F (36.7 °C) (Temporal)   Resp 18   Ht 5' 8\" (1.727 m)   Wt 116 kg (255 lb)   SpO2 99%   BMI 38.77 kg/m²   No LMP for male patient.       Physical " Exam     Physical Exam  Vitals and nursing note reviewed.   Constitutional:       General: He is not in acute distress.     Appearance: He is well-developed.   HENT:      Head: Normocephalic and atraumatic.     Cardiovascular:      Rate and Rhythm: Normal rate and regular rhythm.      Heart sounds: Normal heart sounds. No murmur heard.     No friction rub. No gallop.   Pulmonary:      Effort: Pulmonary effort is normal. No respiratory distress.      Breath sounds: Normal breath sounds. No wheezing or rales.   Chest:      Chest wall: No tenderness.     Musculoskeletal:         General: No deformity.      Right hip: Tenderness, bony tenderness and crepitus present. No deformity or lacerations. Decreased range of motion. Normal strength.     Skin:     General: Skin is warm.      Findings: Bruising and ecchymosis present. No rash.      Comments: Significant bruising on the posterior side of the hip, thigh.     Neurological:      Mental Status: He is alert and oriented to person, place, and time.      Coordination: Coordination normal.      Deep Tendon Reflexes: Reflexes are normal and symmetric. Reflexes normal.     Psychiatric:         Behavior: Behavior normal.         Thought Content: Thought content normal.         Judgment: Judgment normal.                          [1]   Current Outpatient Medications:     Antihemophilic Factor, Recomb, (RECOMBINATE IV), Inject into a catheter in a vein, Disp: , Rfl:     Armodafinil 250 MG tablet, Take 250 mg by mouth in the morning., Disp: , Rfl:     Ascorbic Acid (VITAMIN C) 100 MG tablet, Take 100 mg by mouth in the morning., Disp: , Rfl:     b complex vitamins capsule, Take 1 capsule by mouth in the morning., Disp: , Rfl:     cholecalciferol (VITAMIN D3) 400 units tablet, Take 400 Units by mouth in the morning., Disp: , Rfl:     ciclopirox (LOPROX) 0.77 % cream, , Disp: , Rfl:     cloNIDine (CATAPRES) 0.1 mg tablet, Take 0.1 mg by mouth daily, Disp: , Rfl:     COENZYME Q-10 PO,  Take 100 mg by mouth in the morning., Disp: , Rfl:     cyclobenzaprine (FLEXERIL) 10 mg tablet, Take 10 mg by mouth 3 (three) times a day as needed for muscle spasms, Disp: , Rfl:     esomeprazole (NexIUM) 40 MG capsule, Take 40 mg by mouth in the morning and 40 mg in the evening. Take before meals., Disp: , Rfl:     Esomeprazole Magnesium (NEXIUM 24HR PO), Take by mouth, Disp: , Rfl:     Ferrous Sulfate Dried (FERROUS SULFATE IRON PO), Take 325 mg by mouth, Disp: , Rfl:     furosemide (LASIX) 40 mg tablet, Take 80 mg by mouth in the morning and 80 mg in the evening., Disp: , Rfl:     hydrochlorothiazide (MICROZIDE) 12.5 mg capsule, Take 12.5 mg by mouth daily, Disp: , Rfl:     levothyroxine 100 mcg tablet, Take 100 mcg by mouth daily, Disp: , Rfl:     losartan (COZAAR) 100 MG tablet, Take 100 mg by mouth in the morning., Disp: , Rfl:     losartan-hydrochlorothiazide (HYZAAR) 100-12.5 MG per tablet, Take 1 tablet by mouth in the morning., Disp: , Rfl:     magnesium 30 MG tablet, Take by mouth daily, Disp: , Rfl:     metoprolol tartrate (LOPRESSOR) 25 mg tablet, Take 25 mg by mouth in the morning and 25 mg in the evening and 25 mg before bedtime., Disp: , Rfl:     Multiple Vitamin (MULTIVITAMIN) capsule, Take 1 capsule by mouth in the morning., Disp: , Rfl:     Potassium 99 MG TABS, Take 99 mg by mouth in the morning and 99 mg in the evening and 99 mg before bedtime., Disp: , Rfl:     Psyllium (METAMUCIL PO), Take by mouth daily, Disp: , Rfl:     simvastatin (ZOCOR) 5 MG tablet, Take 5 mg by mouth daily at bedtime, Disp: , Rfl:     Synthroid 125 MCG tablet, Take 1 tablet by mouth in the morning, Disp: , Rfl:     traMADol (Ultram) 50 mg tablet, Take 1 tablet (50 mg total) by mouth every 6 (six) hours as needed for moderate pain, Disp: 20 tablet, Rfl: 0    Tranexamic Acid 650 MG TABS, Take 1,300 mg by mouth daily as needed, Disp: , Rfl:     triamcinolone (KENALOG) 0.1 % cream, Apply topically in the morning and in the  evening., Disp: , Rfl:     vardenafil (LEVITRA) 20 MG tablet, Take 20 mg by mouth daily as needed for erectile dysfunction, Disp: , Rfl:   [2]   Past Medical History:  Diagnosis Date    Arthritis     Cancer (HCC)     prostate, basil cell carcinoma    Factor VIII deficiency hemophilia (HCC)     Hypertension    [3]   Past Surgical History:  Procedure Laterality Date    COLONOSCOPY W/ BIOPSIES AND POLYPECTOMY      PROSTATECTOMY      TONSILLECTOMY AND ADENOIDECTOMY Bilateral     UMBILICAL HERNIA REPAIR     [4]   Family History  Problem Relation Name Age of Onset    No Known Problems Mother      No Known Problems Father

## 2025-06-20 ENCOUNTER — OFFICE VISIT (OUTPATIENT)
Dept: OBGYN CLINIC | Facility: CLINIC | Age: 75
End: 2025-06-20
Attending: PHYSICIAN ASSISTANT
Payer: MEDICARE

## 2025-06-20 VITALS
OXYGEN SATURATION: 94 % | HEIGHT: 68 IN | HEART RATE: 72 BPM | RESPIRATION RATE: 16 BRPM | TEMPERATURE: 98 F | BODY MASS INDEX: 38.71 KG/M2 | WEIGHT: 255.4 LBS

## 2025-06-20 DIAGNOSIS — S70.01XD CONTUSION OF RIGHT HIP, SUBSEQUENT ENCOUNTER: Primary | ICD-10-CM

## 2025-06-20 PROCEDURE — 99204 OFFICE O/P NEW MOD 45 MIN: CPT | Performed by: STUDENT IN AN ORGANIZED HEALTH CARE EDUCATION/TRAINING PROGRAM

## 2025-06-20 NOTE — PROGRESS NOTES
Orthopedic Surgery Office Note  Jeferson Peterson (74 y.o. male)   : 1950   MRN: [id number]   Encounter Date: 2025 with Dr. Sen Pierre, DO  Encounter department: [facility] [place]  Chief Complaint   Patient presents with    Right Hip - Pain       Assessment, Plan, & Discussion:     Assessment & Plan  Contusion of right hip, subsequent encounter  -Reviewed physical exam and imaging with patient at time of visit. Radiographic findings demonstrate no evidence of fracture of right hip. His symptoms are consistent with hip contusion/greater trochanteric bursitis of his Right hip.  -Referral to physical therapy placed at time of visit for ROM and strengthening of right hip  - Discussed with the patient consideration of greater trochanteric bursitis should physical therapy not help to improve his symptoms  - He will follow-up in the office on an as-needed basis if symptoms persist or new symptoms arise  -The patient expresses understanding and is in agreement with today's treatment plan.       Orders:    Ambulatory Referral to Orthopedic Surgery    Ambulatory Referral to Physical Therapy; Future        Surgery:   No surgery planned at this time    Next Visit:      Follow up:  Return if symptoms worsen or fail to improve.  without XR    Tasks:   Re-evaluation of current concern    History of Present Illness:     Jeferson Peterson is a 74 y.o. male who presents for consultation following visit to the ED, here for orthopedic follow up regarding what he was originally told was a right hip fracture however after radiologist read was contacted to let him know that the radiologist did not see a fracture.  Patient was seen in the ED on 2025 for significant right hip pain along with ecchymosis.  Patient states that he is unaware of any mechanism of injury.  He does state that he does have hemophilia.  Patient states that he does have some improved mobility in his right hip however he cannot  "get his leg high enough to put on compression socks.    Review of Systems  Constitutional: Negative for fatigue, fever or loss of appetite.   HENT: Negative.    Respiratory: Negative for shortness of breath, dyspnea.    Cardiovascular: Negative for chest pain/tightness.   Gastrointestinal: Negative for abdominal pain, N/V.   Endocrine: Negative for cold/heat intolerance, unexplained weight loss/gain.   Genitourinary: Negative for flank pain, dysuria  Skin: Negative for rash.    Psychiatric/Behavioral: Negative for agitation.  All else negative unless otherwise noted in HPI    Physical Exam:   General:  Pulse 72   Temp 98 °F (36.7 °C) (Temporal)   Resp 16   Ht 5' 8\" (1.727 m)   Wt 116 kg (255 lb 6.4 oz)   SpO2 94%   BMI 38.83 kg/m²   Estimated body mass index is 38.83 kg/m² as calculated from the following:    Height as of this encounter: 5' 8\" (1.727 m).    Weight as of this encounter: 116 kg (255 lb 6.4 oz).  Cons: Appears well.  No apparent distress.  Psych: Alert. Oriented.  Mood and affect normal.  HEENT: PERRLA, EOMI, Eyes clear, moist mucus membranes, hearing intact  Resp: Normal effort.  No audible wheezing or stridor. equal symmetric chest expansion.  CV: Extremities warm and well perfused. no chest pain, no palpitations, no discernable arrhythmia  Well Perfused peripherally, palpable distal pulse  Abd:    No distention or guarding. no peritoneal signs  Skin: Warm. No visible lesions.no clubbing, no cyanosis  Neuro: Normal muscle tone.     Orthopedic Exam:   Right Hip Exam  Alignment / Posture:  Normal resting hip posture. Leg lengths appear equal. Normal knee alignment. Normal ankle alignment.  Inspection:  Mild swelling. No edema. No erythema.  Resolving ecchymosis. No muscle atrophy. No deformity.  Palpation:  Significant tenderness at greater trochanteric bursitis. No effusion. No warmth. No crepitus. No clicking, catching, or snapping.  ROM:  Normal hip ROM to left hip.  Decreased range of motion " in hip flexion and internal rotation due to pain.  Normal knee ROM.  Strength:  Hip Flexors 5/5. Quadriceps 4/5. Hamstrings 4/5.  Stability:  No objective hip instability.  Tests:  (+) FADDIR. (+) JOSEPH.  Neurovascular:  Sensation intact in DP/SP/Nation/Sa/T nerve distributions. 2+ DP & PT pulses.  Gait:  Steady with cane. Antalgic.       Imaging/Studies:     Study: XR right hip  Date: 6/17/25  Report: I have read and agree with the radiologist report. and I have personally reviewed the imaging via [employer] PACS (Picture Archiving and Communication System) and my impression is as follows:  Impression:   Mild arthritis of the hips. Soft tissue swelling/edema superficially near the right hip and buttock region. Correlate for possible contusion.  Evidence of calcific insertion point of the gluteal musculature at the greater trochanter     Significant lower lumbar disc degenerative changes noted.    Procedures  No procedures today.    Medical, Surgical, Family, and Social History    The patient's medical history, family history, and social history, were reviewed and updated as appropriate.  Past Medical History[1]  Past Surgical History[2]  Family History[3]  Social History     Occupational History    Not on file   Tobacco Use    Smoking status: Former    Smokeless tobacco: Never   Vaping Use    Vaping status: Never Used   Substance and Sexual Activity    Alcohol use: Not Currently     Comment: social     Drug use: Never    Sexual activity: Not on file     Allergies[4]  Current Medications[5]  This Visit:     Scribe Attestation      I,:  Felisha Saucedo am acting as a scribe while in the presence of the attending physician.:       I,:  Sen Pierre DO personally performed the services described in this documentation    as scribed in my presence.:             Dr. Sen Pierre DO, Orthopedic Surgeon  [facility]thopedic Oncology [facility]          [1]   Past Medical History:  Diagnosis Date    Arthritis     Cancer (HCC)      prostate, basil cell carcinoma    Factor VIII deficiency hemophilia (HCC)     Hypertension    [2]   Past Surgical History:  Procedure Laterality Date    COLONOSCOPY W/ BIOPSIES AND POLYPECTOMY      PROSTATECTOMY      TONSILLECTOMY AND ADENOIDECTOMY Bilateral     UMBILICAL HERNIA REPAIR     [3]   Family History  Problem Relation Name Age of Onset    No Known Problems Mother      No Known Problems Father     [4]   Allergies  Allergen Reactions    Doxazosin Hypertension     incr bp    Enalapril-Hydrochlorothiazide Palpitations    Omeprazole GI Intolerance     uncontrollable acid reflex    Other reaction(s): Other   uncontrollable acid reflex    Allopurinol Other (See Comments)     Other reaction(s): NUMBNESS    Amlodipine Other (See Comments)     Other reaction(s): edema   Other reaction(s): Other   Other reaction(s): numbness in toes, throbbing in legs, leg cramps    Aspirin GI Bleeding     Other reaction(s): Other    Calcium Channel Blockers Other (See Comments)     ???    Diltiazem Other (See Comments)     Muscle spasims    ???      ???    Gabapentin Other (See Comments)     Other reaction(s): Other    Ibuprofen GI Bleeding     Cause bleeding    Other reaction(s): Other   Cause bleeding    Lansoprazole Other (See Comments)     Numbness     Other reaction(s): Other   Numbness    Levothyroxine Myalgia    Other Other (See Comments)     ???    Oxaprozin Other (See Comments)     Causes bleeding    Other reaction(s): Other   Causes bleeding    Other reaction(s): Other      Causes bleeding      Causes bleeding  Other reaction(s): Other Causes bleeding    Protonix [Pantoprazole]     Ranitidine Other (See Comments)    Rofecoxib Other (See Comments)     Causes bleeding    Vasorex [Isoxsuprine]     Enalapril Palpitations   [5]   Current Outpatient Medications:     Antihemophilic Factor, Recomb, (RECOMBINATE IV), Inject into a catheter in a vein, Disp: , Rfl:     Armodafinil 250 MG tablet, Take 250 mg by mouth in the  morning., Disp: , Rfl:     Ascorbic Acid (VITAMIN C) 100 MG tablet, Take 100 mg by mouth in the morning., Disp: , Rfl:     b complex vitamins capsule, Take 1 capsule by mouth in the morning., Disp: , Rfl:     cholecalciferol (VITAMIN D3) 400 units tablet, Take 400 Units by mouth in the morning., Disp: , Rfl:     ciclopirox (LOPROX) 0.77 % cream, , Disp: , Rfl:     cloNIDine (CATAPRES) 0.1 mg tablet, Take 0.1 mg by mouth in the morning., Disp: , Rfl:     COENZYME Q-10 PO, Take 100 mg by mouth in the morning., Disp: , Rfl:     cyclobenzaprine (FLEXERIL) 10 mg tablet, Take 10 mg by mouth as needed in the morning and 10 mg as needed at noon and 10 mg as needed in the evening for muscle spasms., Disp: , Rfl:     esomeprazole (NexIUM) 40 MG capsule, Take 40 mg by mouth in the morning and 40 mg in the evening. Take before meals., Disp: , Rfl:     Esomeprazole Magnesium (NEXIUM 24HR PO), Take by mouth, Disp: , Rfl:     Ferrous Sulfate Dried (FERROUS SULFATE IRON PO), Take 325 mg by mouth, Disp: , Rfl:     furosemide (LASIX) 40 mg tablet, Take 80 mg by mouth in the morning and 80 mg in the evening., Disp: , Rfl:     hydrochlorothiazide (MICROZIDE) 12.5 mg capsule, Take 12.5 mg by mouth in the morning., Disp: , Rfl:     levothyroxine 100 mcg tablet, Take 100 mcg by mouth in the morning., Disp: , Rfl:     losartan (COZAAR) 100 MG tablet, Take 100 mg by mouth in the morning., Disp: , Rfl:     losartan-hydrochlorothiazide (HYZAAR) 100-12.5 MG per tablet, Take 1 tablet by mouth in the morning., Disp: , Rfl:     magnesium 30 MG tablet, Take by mouth in the morning., Disp: , Rfl:     metoprolol tartrate (LOPRESSOR) 25 mg tablet, Take 25 mg by mouth in the morning and 25 mg in the evening and 25 mg before bedtime., Disp: , Rfl:     Multiple Vitamin (MULTIVITAMIN) capsule, Take 1 capsule by mouth in the morning., Disp: , Rfl:     Potassium 99 MG TABS, Take 99 mg by mouth in the morning and 99 mg in the evening and 99 mg before  bedtime., Disp: , Rfl:     Psyllium (METAMUCIL PO), Take by mouth in the morning., Disp: , Rfl:     psyllium (METAMUCIL) 58.6 % packet, Take 1 packet by mouth, Disp: , Rfl:     simvastatin (ZOCOR) 5 MG tablet, Take 5 mg by mouth daily at bedtime, Disp: , Rfl:     Synthroid 125 MCG tablet, Take 1 tablet by mouth in the morning, Disp: , Rfl:     traMADol (Ultram) 50 mg tablet, Take 1 tablet (50 mg total) by mouth every 6 (six) hours as needed for moderate pain, Disp: 20 tablet, Rfl: 0    Tranexamic Acid 650 MG TABS, Take 1,300 mg by mouth daily as needed, Disp: , Rfl:     triamcinolone (KENALOG) 0.1 % cream, Apply topically in the morning and in the evening., Disp: , Rfl:     vardenafil (LEVITRA) 20 MG tablet, Take 20 mg by mouth daily as needed for erectile dysfunction, Disp: , Rfl:

## 2025-06-25 ENCOUNTER — EVALUATION (OUTPATIENT)
Dept: PHYSICAL THERAPY | Facility: CLINIC | Age: 75
End: 2025-06-25
Payer: MEDICARE

## 2025-06-25 DIAGNOSIS — M25.551 RIGHT HIP PAIN: Primary | ICD-10-CM

## 2025-06-25 DIAGNOSIS — S70.01XD CONTUSION OF RIGHT HIP, SUBSEQUENT ENCOUNTER: ICD-10-CM

## 2025-06-25 PROCEDURE — 97110 THERAPEUTIC EXERCISES: CPT | Performed by: PHYSICAL THERAPIST

## 2025-06-25 PROCEDURE — 97161 PT EVAL LOW COMPLEX 20 MIN: CPT | Performed by: PHYSICAL THERAPIST

## 2025-06-25 PROCEDURE — 97140 MANUAL THERAPY 1/> REGIONS: CPT | Performed by: PHYSICAL THERAPIST

## 2025-06-25 NOTE — LETTER
2025    Sen Pierre DO  [address] [facility] [address]    Patient: Jeferson Peterson   YOB: 1950   Date of Visit: 2025     Encounter Diagnosis     ICD-10-CM    1. Right hip pain  M25.551       2. Contusion of right hip, subsequent encounter  S70.01XD           Dear Dr. Sen Pierre, DO:    Thank you for your recent referral of Jeferson Peterson. Please review the attached evaluation summary from Jeferson's recent visit.     Please verify that you agree with the plan of care by signing the attached order.     If you have any questions or concerns, please do not hesitate to call.     I sincerely appreciate the opportunity to share in the care of one of your patients and hope to have another opportunity to work with you in the near future.       Sincerely,    Osmani Ledezma, PT      Referring Provider:      I certify that I have read the below Plan of Care and certify the need for these services furnished under this plan of treatment while under my care.                    Sen Pierre DO  [address] [facility] [address]  Via In Basket          PT Evaluation     Today's date: 2025  Patient name: Jeferson Peterson  : 1950  MRN: [id number]  Referring provider: Sen Pierre DO  Dx:   Encounter Diagnosis     ICD-10-CM    1. Right hip pain  M25.551       2. Contusion of right hip, subsequent encounter  S70.01XD           Start Time: 1015  Stop Time: 1100  Total time in clinic (min): 45 minutes    Assessment  Impairments: abnormal or restricted ROM, activity intolerance, impaired physical strength, lacks appropriate home exercise program, pain with function and activity limitations  Symptom irritability: moderate    Assessment details: Patient is a 73 y/o male with chief c/o R hip/gluteal pain. Patient has tenderness to the R gluteal region with palpation today. There is noted restriction with attempts of placing R ankle on the L knee in sitting secondary to increased pain to  "the R gluteal region. Positive JOSEPH and FADIR tests were identified. Mild to moderate limitations to R hip rom is noted. Mild to moderate limitations of R hip strength most noted with resisted hip abduction. He reports mild discomfort to the posterior hip at end range of hip abduction and flexion but motion did improve as the exercise progressed. Exercises were issued for home program along with instruction of utilization of pain as a guideline with activity.   Understanding of Dx/Px/POC: good     Prognosis: good    Goals  STGs:  Patient will be independent with hep by 2-3 visits.   Improve Hip rom to wnl for improved tolerance with ambulation and transfers by 3-4 weeks.   Patient will be able to negotiate a flight of stairs without limitation or restriction from the hip by 3-4 weeks.   Patient will report a 50% reduction in hip pain allowing for improved activities including walking and standing by 3-4 weeks.     LTGs:  \"Improve FOTO score from 52 to 63 indicating improved tolerance with activities involving the lower extremities by discharge.   Improve hip rom and strength to wnl for improved tolerance with housework and ambulation by discharge.   Patient will be able to return to normal housework without limitation from the hip by discharge.   Ambulation will be performed on all surfaces without deviation or limitation by discharge. \"  Patient will be able to get his R foot onto his L knee allowing him to don/doff his compression stockings by discharge.       Plan  Patient would benefit from: skilled physical therapy  Planned modality interventions: cryotherapy    Planned therapy interventions: ADL retraining, balance/weight bearing training, flexibility, functional ROM exercises, gait training, home exercise program, therapeutic exercise, therapeutic activities, stretching, strengthening, patient education, neuromuscular re-education, manual therapy and joint mobilization    Duration in weeks: 6  Plan of Care " beginning date: 2025  Plan of Care expiration date: 2025  Treatment plan discussed with: patient  Plan details: Patient will continue to benefit from skilled physical therapy to address the functional deficits that were identified during the evaluation today. We will continue to progress the therapy program to address these functional deficits and achieve the established goals.   Patient informed that from this point forward, to ensure adherence to the aforementioned plan of care, all or some of the treatment may be performed and carried out by a Physical Therapy Assistant (PTA) with supervision from a licensed Physical Therapist (PT) in accordance with [facility] Physical Therapy Practice Act.              Subjective Evaluation    History of Present Illness  Mechanism of injury: Patient presents to out patient physical therapy with chief c/o R hip pain. Patient reports onset of R hip pain approximately 2-3 weeks ago when he was getting up from a chair at his desk. He notes that the pain is localized to the R hip and notes that initially he had some bruising in the R gluteal region. He has increased pain when he is trying to lift his leg up to get his compression socks on and off. He notes that he has difficulties getting his compression socks on since this started and he has also felt an increase in R gluteal pain when driving but feels that this is slowly improving. He is using a SPC for assistance with ambulation but was using this prior to the onset of R hip pain.           Not a recurrent problem   Quality of life: good    Pain  Current pain ratin  At best pain ratin  At worst pain ratin  Location: R hip  Quality: discomfort, dull ache, sharp, knife-like and pulling  Relieving factors: rest and medications  Aggravating factors: standing, walking and stair climbing (crossing leg to don/doff socks/shoes)          Objective     Palpation     Right   Tenderness of the gluteus elijah  "and gluteus medius.     Active Range of Motion   Left Hip   Flexion: 103 degrees   Abduction: 46 degrees   External rotation (90/90): 51 degrees   Internal rotation (90/90): 26 degrees     Right Hip   Flexion: 81 degrees with pain  Abduction: 36 degrees   External rotation (90/90): 36 degrees   Internal rotation (90/90): 30 degrees     Passive Range of Motion   Left Hip   Flexion: 113 degrees   Abduction: 46 degrees     Right Hip   Flexion: 90 degrees with pain  Abduction: 40 degrees     Strength/Myotome Testing     Left Hip   Planes of Motion   Flexion: 4+  Extension: 4+  Abduction: 4+  Adduction: 4+    Right Hip   Planes of Motion   Flexion: 4  Extension: 4  Abduction: 3+  Adduction: 4+    Tests     Right Hip   Positive JOSEPH and MARIE.              Precautions: Hx of lumbar dysfunction      Date 6/25 IE       FOTO 52 SC       Manuals        Hip prom 8 min                               Neuro Re-Ed        Tandem walking NV       SLS NV                                               Ther Ex        Heel slide abduction 15x       Gluteal squeeze 5\" 15x       SKTC 5\" 15x       NuStep for mobility and strengthening NV       Standing hip abduction NV       Standing hip extension NV                       Ther Activity        Step up NV       Mini squats NV       Gait Training                        Modalities                                               "

## 2025-06-25 NOTE — PROGRESS NOTES
"PT Evaluation     Today's date: 2025  Patient name: Jeferson Peterson  : 1950  MRN: [id number]  Referring provider: Sen Pierre DO  Dx:   Encounter Diagnosis     ICD-10-CM    1. Right hip pain  M25.551       2. Contusion of right hip, subsequent encounter  S70.01XD           Start Time: 1015  Stop Time: 1100  Total time in clinic (min): 45 minutes    Assessment  Impairments: abnormal or restricted ROM, activity intolerance, impaired physical strength, lacks appropriate home exercise program, pain with function and activity limitations  Symptom irritability: moderate    Assessment details: Patient is a 73 y/o male with chief c/o R hip/gluteal pain. Patient has tenderness to the R gluteal region with palpation today. There is noted restriction with attempts of placing R ankle on the L knee in sitting secondary to increased pain to the R gluteal region. Positive JOSEPH and FADIR tests were identified. Mild to moderate limitations to R hip rom is noted. Mild to moderate limitations of R hip strength most noted with resisted hip abduction. He reports mild discomfort to the posterior hip at end range of hip abduction and flexion but motion did improve as the exercise progressed. Exercises were issued for home program along with instruction of utilization of pain as a guideline with activity.   Understanding of Dx/Px/POC: good     Prognosis: good    Goals  STGs:  Patient will be independent with hep by 2-3 visits.   Improve Hip rom to wnl for improved tolerance with ambulation and transfers by 3-4 weeks.   Patient will be able to negotiate a flight of stairs without limitation or restriction from the hip by 3-4 weeks.   Patient will report a 50% reduction in hip pain allowing for improved activities including walking and standing by 3-4 weeks.     LTGs:  \"Improve FOTO score from 52 to 63 indicating improved tolerance with activities involving the lower extremities by discharge.   Improve hip rom and strength " "to wnl for improved tolerance with housework and ambulation by discharge.   Patient will be able to return to normal housework without limitation from the hip by discharge.   Ambulation will be performed on all surfaces without deviation or limitation by discharge. \"  Patient will be able to get his R foot onto his L knee allowing him to don/doff his compression stockings by discharge.       Plan  Patient would benefit from: skilled physical therapy  Planned modality interventions: cryotherapy    Planned therapy interventions: ADL retraining, balance/weight bearing training, flexibility, functional ROM exercises, gait training, home exercise program, therapeutic exercise, therapeutic activities, stretching, strengthening, patient education, neuromuscular re-education, manual therapy and joint mobilization    Duration in weeks: 6  Plan of Care beginning date: 6/25/2025  Plan of Care expiration date: 8/6/2025  Treatment plan discussed with: patient  Plan details: Patient will continue to benefit from skilled physical therapy to address the functional deficits that were identified during the evaluation today. We will continue to progress the therapy program to address these functional deficits and achieve the established goals.   Patient informed that from this point forward, to ensure adherence to the aforementioned plan of care, all or some of the treatment may be performed and carried out by a Physical Therapy Assistant (PTA) with supervision from a licensed Physical Therapist (PT) in accordance with [facility] Physical Therapy Practice Act.              Subjective Evaluation    History of Present Illness  Mechanism of injury: Patient presents to out patient physical therapy with chief c/o R hip pain. Patient reports onset of R hip pain approximately 2-3 weeks ago when he was getting up from a chair at his desk. He notes that the pain is localized to the R hip and notes that initially he had some bruising in " "the R gluteal region. He has increased pain when he is trying to lift his leg up to get his compression socks on and off. He notes that he has difficulties getting his compression socks on since this started and he has also felt an increase in R gluteal pain when driving but feels that this is slowly improving. He is using a SPC for assistance with ambulation but was using this prior to the onset of R hip pain.           Not a recurrent problem   Quality of life: good    Pain  Current pain ratin  At best pain ratin  At worst pain ratin  Location: R hip  Quality: discomfort, dull ache, sharp, knife-like and pulling  Relieving factors: rest and medications  Aggravating factors: standing, walking and stair climbing (crossing leg to don/doff socks/shoes)          Objective     Palpation     Right   Tenderness of the gluteus elijah and gluteus medius.     Active Range of Motion   Left Hip   Flexion: 103 degrees   Abduction: 46 degrees   External rotation (90/90): 51 degrees   Internal rotation (90/90): 26 degrees     Right Hip   Flexion: 81 degrees with pain  Abduction: 36 degrees   External rotation (90/90): 36 degrees   Internal rotation (90/90): 30 degrees     Passive Range of Motion   Left Hip   Flexion: 113 degrees   Abduction: 46 degrees     Right Hip   Flexion: 90 degrees with pain  Abduction: 40 degrees     Strength/Myotome Testing     Left Hip   Planes of Motion   Flexion: 4+  Extension: 4+  Abduction: 4+  Adduction: 4+    Right Hip   Planes of Motion   Flexion: 4  Extension: 4  Abduction: 3+  Adduction: 4+    Tests     Right Hip   Positive JOSEPH and FADIR.              Precautions: Hx of lumbar dysfunction      Date  IE       FOTO 52 SC       Manuals        Hip prom 8 min                               Neuro Re-Ed        Tandem walking NV       SLS NV                                               Ther Ex        Heel slide abduction 15x       Gluteal squeeze 5\" 15x       SKTC 5\" 15x       NuStep " for mobility and strengthening NV       Standing hip abduction NV       Standing hip extension NV                       Ther Activity        Step up NV       Mini squats NV       Gait Training                        Modalities

## 2025-06-27 ENCOUNTER — OFFICE VISIT (OUTPATIENT)
Dept: PHYSICAL THERAPY | Facility: CLINIC | Age: 75
End: 2025-06-27
Payer: MEDICARE

## 2025-06-27 DIAGNOSIS — M25.551 RIGHT HIP PAIN: Primary | ICD-10-CM

## 2025-06-27 DIAGNOSIS — S70.01XD CONTUSION OF RIGHT HIP, SUBSEQUENT ENCOUNTER: ICD-10-CM

## 2025-06-27 PROCEDURE — 97110 THERAPEUTIC EXERCISES: CPT | Performed by: PHYSICAL THERAPIST

## 2025-06-27 PROCEDURE — 97530 THERAPEUTIC ACTIVITIES: CPT | Performed by: PHYSICAL THERAPIST

## 2025-06-27 PROCEDURE — 97112 NEUROMUSCULAR REEDUCATION: CPT | Performed by: PHYSICAL THERAPIST

## 2025-06-27 NOTE — PROGRESS NOTES
"Daily Note     Today's date: 2025  Patient name: Jeferson Peterson  : 1950  MRN: [id number]  Referring provider: Sen Pierre DO  Dx:   Encounter Diagnosis     ICD-10-CM    1. Right hip pain  M25.551       2. Contusion of right hip, subsequent encounter  S70.01XD           Start Time: 1015  Stop Time: 1100  Total time in clinic (min): 45 minutes    Subjective: Patient reports good tolerance for HEP and feels that his hip is slowly improving. He is reporting some low back discomfort entering therapy today.       Objective: See treatment diary below      Assessment: Tolerated treatment well. Patient demonstrated fatigue post treatment, exhibited good technique with therapeutic exercises, and would benefit from continued PT Low back discomfort was slight irritated during standing hip abduction secondary to compensation with lumbar extension. There was good response to progression of light strengthening without much irritation to the R gluteal region with the exception of step ups.       Plan: Continue per plan of care.  Progress treatment as tolerated.       Precautions: Hx of lumbar dysfunction      Date  IE       FOTO 52 SC       Manuals        Hip prom 8 min 5 min                              Neuro Re-Ed        Tandem walking NV 3 laps      SLS NV 15\" 3x                                              Ther Ex        Heel slide abduction and flexion 15x 15x ea.       Gluteal squeeze 5\" 15x 5\" 20x      SKTC 5\" 15x       NuStep for mobility and strengthening NV L5 5 min      Standing hip abduction NV 20x ea      Standing hip extension NV 20x ea                      Ther Activity        Step up NV 6\" 15x      Mini squats NV 20x      Gait Training                        Modalities                               "

## 2025-07-01 ENCOUNTER — OFFICE VISIT (OUTPATIENT)
Dept: PHYSICAL THERAPY | Facility: CLINIC | Age: 75
End: 2025-07-01
Payer: MEDICARE

## 2025-07-01 DIAGNOSIS — S70.01XD CONTUSION OF RIGHT HIP, SUBSEQUENT ENCOUNTER: Primary | ICD-10-CM

## 2025-07-01 DIAGNOSIS — M25.551 RIGHT HIP PAIN: ICD-10-CM

## 2025-07-01 PROCEDURE — 97530 THERAPEUTIC ACTIVITIES: CPT

## 2025-07-01 PROCEDURE — 97112 NEUROMUSCULAR REEDUCATION: CPT

## 2025-07-01 PROCEDURE — 97110 THERAPEUTIC EXERCISES: CPT

## 2025-07-03 ENCOUNTER — OFFICE VISIT (OUTPATIENT)
Dept: PHYSICAL THERAPY | Facility: CLINIC | Age: 75
End: 2025-07-03
Payer: MEDICARE

## 2025-07-03 DIAGNOSIS — M25.551 RIGHT HIP PAIN: ICD-10-CM

## 2025-07-03 DIAGNOSIS — S70.01XD CONTUSION OF RIGHT HIP, SUBSEQUENT ENCOUNTER: Primary | ICD-10-CM

## 2025-07-03 PROCEDURE — 97110 THERAPEUTIC EXERCISES: CPT | Performed by: PHYSICAL THERAPIST

## 2025-07-03 PROCEDURE — 97530 THERAPEUTIC ACTIVITIES: CPT | Performed by: PHYSICAL THERAPIST

## 2025-07-03 PROCEDURE — 97112 NEUROMUSCULAR REEDUCATION: CPT | Performed by: PHYSICAL THERAPIST

## 2025-07-03 NOTE — PROGRESS NOTES
"Daily Note     Today's date: 7/3/2025  Patient name: Jeferson Peterson  : 1950  MRN: [id number]  Referring provider: Sen Pierre DO  Dx:   Encounter Diagnosis     ICD-10-CM    1. Contusion of right hip, subsequent encounter  S70.01XD       2. Right hip pain  M25.551           Start Time: 1015  Stop Time: 1057  Total time in clinic (min): 42 minutes    Subjective: Patient reports improving symptoms into the R hip. He has been able to get his R foot up onto his L knee to get his socks and shoes on since this past .       Objective: See treatment diary below      Assessment: Tolerated treatment well. Patient demonstrated fatigue post treatment and exhibited good technique with therapeutic exercises  Patient was able to complete all interventions without reproduction of symptoms today. He noted mild stiffness at end range of motion to the R hip but otherwise responded well to therapy interventions. We reviewed all exercises for continued completion on his own and instructed him to return to the office with any issues.       Plan: Patient is in mutual agreement with discharge to a self guided home program at this time secondary to no reports of pain and return of function.      Precautions: Hx of lumbar dysfunction      Date  IE 6/27 7/1 7/3 DC    FOTO 52 SC       Manuals        Hip prom 8 min 5 min 5 min 5 min                            Neuro Re-Ed        Tandem walking NV 3 laps 3 laps 5 laps    SLS NV 15\" 3x 15 \" 3 x Airex 20\" 5x                                            Ther Ex        Heel slide abduction and flexion 15x 15x ea.  15x      Gluteal squeeze 5\" 15x 5\" 20x 5\"  20 x 5\" 20x    SKTC 5\" 15x       NuStep for mobility and strengthening NV L5 5 min L 5 6 min  L6 8 min    Standing hip abduction NV 20x ea 20 x 20x ea.     Standing hip extension NV 20x ea 20 x 20x ea.                     Ther Activity        Step up NV 6\" 15x 6\" 15 x 8\" 15x    Mini squats NV 20x 20 x 20x    Gait Training      "                   Modalities

## 2025-07-26 ENCOUNTER — OFFICE VISIT (OUTPATIENT)
Dept: URGENT CARE | Facility: CLINIC | Age: 75
End: 2025-07-26
Payer: MEDICARE

## 2025-07-26 VITALS
HEART RATE: 68 BPM | DIASTOLIC BLOOD PRESSURE: 70 MMHG | TEMPERATURE: 97.9 F | OXYGEN SATURATION: 95 % | SYSTOLIC BLOOD PRESSURE: 132 MMHG | RESPIRATION RATE: 18 BRPM

## 2025-07-26 DIAGNOSIS — S40.861A TICK BITE OF RIGHT UPPER ARM, INITIAL ENCOUNTER: ICD-10-CM
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
## 2025-07-26 DIAGNOSIS — L03.90 CELLULITIS, UNSPECIFIED CELLULITIS SITE: Primary | ICD-10-CM

## 2025-07-26 PROCEDURE — 99213 OFFICE O/P EST LOW 20 MIN: CPT

## 2025-07-26 PROCEDURE — G0463 HOSPITAL OUTPT CLINIC VISIT: HCPCS

## 2025-07-26 RX ORDER — DOXYCYCLINE 100 MG/1
100 TABLET ORAL 2 TIMES DAILY
Qty: 14 TABLET | Refills: 0 | Status: SHIPPED | OUTPATIENT
Start: 2025-07-26 | End: 2025-08-02

## 2025-07-26 NOTE — PROGRESS NOTES
"[facility] Now  Name: Jeferson Peterson      : 1950      MRN: [id number]  Encounter Provider: Angela Lombardo, CRNP  Encounter Date: 2025   Encounter department: [facility]  :  Assessment & Plan  Cellulitis, unspecified cellulitis site    Orders:    doxycycline (ADOXA) 100 MG tablet; Take 1 tablet (100 mg total) by mouth 2 (two) times a day for 7 days    Tick bite of right upper arm, initial encounter  2.5 cm circular bulls eye rash on left arm. Patient bit by insect reported to have legs           Patient Instructions  Take doxy as directed.   Doxycycline may cause your skin to be more sensitive to sunlight than it is normally. Exposure to sunlight, even for short periods of time, may cause skin rash, itching, redness or other discoloration of the skin, or a severe sunburn. Practice proper precautions including avoiding excessive sunlight exposure, wear skin protection including clothing and sunscreen to avoid reaction.     Follow up with PCP in 3-5 days.  Proceed to  ER if symptoms worsen.    If tests are performed, our office will contact you with results only if changes need to made to the care plan discussed with you at the visit. You can review your full results on [facility] MyChart.    Chief Complaint:   Chief Complaint   Patient presents with    Insect Bite     Was an open wound from scratching.  Last  noticed a bug on the open wound on upper right ar.  Area is red and edematous. Diameter is approx 1.5\".Applying polysporin and bandaid.  It is painful     History of Present Illness   Patinet presents with 2.5 cm red tona on his right arm circular in appearance. He reported he has been scratching it and now it swollen and getting red and hot           Review of Systems   Constitutional:  Negative for chills and fever.   HENT:  Negative for ear pain and sore throat.    Eyes:  Negative for pain and visual disturbance.   Respiratory:  Negative for cough and shortness of " breath.    Cardiovascular:  Negative for chest pain and palpitations.   Gastrointestinal:  Negative for abdominal pain and vomiting.   Genitourinary:  Negative for dysuria and hematuria.   Musculoskeletal:  Negative for arthralgias and back pain.   Skin:  Positive for wound. Negative for color change and rash.   Neurological:  Negative for seizures and syncope.   All other systems reviewed and are negative.    Past Medical History   Past Medical History[1]  Past Surgical History[2]  Family History[3]  he reports that he has quit smoking. He has never used smokeless tobacco. He reports that he does not currently use alcohol. He reports that he does not use drugs.  Current Outpatient Medications   Medication Instructions    Antihemophilic Factor, Recomb, (RECOMBINATE IV) Inject into a catheter in a vein    Armodafinil 250 mg, Daily    b complex vitamins capsule 1 capsule, Daily    cholecalciferol (VITAMIN D3) 400 Units, Daily    ciclopirox (LOPROX) 0.77 % cream     cloNIDine (CATAPRES) 0.1 mg, Daily    COENZYME Q-10  mg, Daily    cyclobenzaprine (FLEXERIL) 10 mg, 3 times daily PRN    doxycycline (ADOXA) 100 mg, Oral, 2 times daily    esomeprazole (NEXIUM) 40 mg, 2 times daily before meals    Esomeprazole Magnesium (NEXIUM 24HR PO) Take by mouth    Ferrous Sulfate Dried (FERROUS SULFATE IRON PO) 325 mg    furosemide (LASIX) 80 mg, 2 times daily    hydroCHLOROthiazide 12.5 mg, Daily    levothyroxine 100 mcg, Daily    losartan (COZAAR) 100 mg, Daily    losartan-hydrochlorothiazide (HYZAAR) 100-12.5 MG per tablet 1 tablet, Daily    magnesium 30 MG tablet Daily    metoprolol tartrate (LOPRESSOR) 25 mg, 3 times daily    Multiple Vitamin (MULTIVITAMIN) capsule 1 capsule, Daily    Potassium 99 mg, 3 times daily    Psyllium (METAMUCIL PO) Daily    psyllium (METAMUCIL) 58.6 % packet 1 packet    simvastatin (ZOCOR) 5 mg, Daily at bedtime    Synthroid 125 MCG tablet 1 tablet, Daily    traMADol (ULTRAM) 50 mg, Oral, Every 6  "hours PRN    Tranexamic Acid 1,300 mg, Daily PRN    triamcinolone (KENALOG) 0.1 % cream 2 times daily    vardenafil (LEVITRA) 20 mg, Daily PRN    vitamin C 100 mg, Daily   Allergies[4]     Objective   /70 (BP Location: Left arm)   Pulse 68   Temp 97.9 °F (36.6 °C) (Skin)   Resp 18   SpO2 95%      Physical Exam  Vitals and nursing note reviewed.   Constitutional:       General: He is not in acute distress.     Appearance: Normal appearance. He is well-developed.   HENT:      Head: Normocephalic and atraumatic.     Cardiovascular:      Rate and Rhythm: Normal rate and regular rhythm.      Pulses: Normal pulses.      Heart sounds: Normal heart sounds. No murmur heard.  Pulmonary:      Effort: Pulmonary effort is normal. No respiratory distress.      Breath sounds: Normal breath sounds. No wheezing, rhonchi or rales.     Musculoskeletal:         General: No swelling. Normal range of motion.      Cervical back: Normal range of motion and neck supple. No rigidity.   Lymphadenopathy:      Cervical: No cervical adenopathy.     Skin:     Capillary Refill: Capillary refill takes less than 2 seconds.      Findings: Burn present.     Neurological:      Mental Status: He is alert.      Sensory: Sensation is intact. No sensory deficit.     Psychiatric:         Mood and Affect: Mood normal.         Portions of the record may have been created with voice recognition software.  Occasional wrong word or \"sound a like\" substitutions may have occurred due to the inherent limitations of voice recognition software.  Read the chart carefully and recognize, using context, where substitutions have occurred.         [1]   Past Medical History:  Diagnosis Date    Arthritis     Cancer (HCC)     prostate, basil cell carcinoma    Factor VIII deficiency hemophilia (HCC)     Hypertension    [2]   Past Surgical History:  Procedure Laterality Date    COLONOSCOPY W/ BIOPSIES AND POLYPECTOMY      PROSTATECTOMY      TONSILLECTOMY AND " ADENOIDECTOMY Bilateral     UMBILICAL HERNIA REPAIR     [3]   Family History  Problem Relation Name Age of Onset    No Known Problems Mother      No Known Problems Father     [4]   Allergies  Allergen Reactions    Doxazosin Hypertension     incr bp    Enalapril-Hydrochlorothiazide Palpitations    Omeprazole GI Intolerance     uncontrollable acid reflex    Other reaction(s): Other   uncontrollable acid reflex    Allopurinol Other (See Comments)     Other reaction(s): NUMBNESS    Amlodipine Other (See Comments)     Other reaction(s): edema   Other reaction(s): Other   Other reaction(s): numbness in toes, throbbing in legs, leg cramps    Aspirin GI Bleeding     Other reaction(s): Other    Calcium Channel Blockers Other (See Comments)     ???    Diltiazem Other (See Comments)     Muscle spasims    ???      ???    Gabapentin Other (See Comments)     Other reaction(s): Other    Ibuprofen GI Bleeding     Cause bleeding    Other reaction(s): Other   Cause bleeding    Lansoprazole Other (See Comments)     Numbness     Other reaction(s): Other   Numbness    Levothyroxine Myalgia    Other Other (See Comments)     ???    Oxaprozin Other (See Comments)     Causes bleeding    Other reaction(s): Other   Causes bleeding    Other reaction(s): Other      Causes bleeding      Causes bleeding  Other reaction(s): Other Causes bleeding    Protonix [Pantoprazole]     Ranitidine Other (See Comments)    Rofecoxib Other (See Comments)     Causes bleeding    Vasorex [Isoxsuprine]     Enalapril Palpitations

## 2025-07-26 NOTE — PATIENT INSTRUCTIONS
Patient Instructions  Take doxy as directed.   Doxycycline may cause your skin to be more sensitive to sunlight than it is normally. Exposure to sunlight, even for short periods of time, may cause skin rash, itching, redness or other discoloration of the skin, or a severe sunburn. Practice proper precautions including avoiding excessive sunlight exposure, wear skin protection including clothing and sunscreen to avoid reaction.     Follow up with PCP in 3-5 days.  Proceed to  ER if symptoms worsen.    If tests are performed, our office will contact you with results only if changes need to made to the care plan discussed with you at the visit. You can review your full results on [facility] MyChart.